# Patient Record
Sex: FEMALE | Race: WHITE | NOT HISPANIC OR LATINO | Employment: FULL TIME | URBAN - METROPOLITAN AREA
[De-identification: names, ages, dates, MRNs, and addresses within clinical notes are randomized per-mention and may not be internally consistent; named-entity substitution may affect disease eponyms.]

---

## 2017-01-25 ENCOUNTER — APPOINTMENT (OUTPATIENT)
Dept: LAB | Age: 67
End: 2017-01-25
Payer: COMMERCIAL

## 2017-01-25 ENCOUNTER — TRANSCRIBE ORDERS (OUTPATIENT)
Dept: ADMINISTRATIVE | Age: 67
End: 2017-01-25

## 2017-01-25 DIAGNOSIS — I10 ESSENTIAL HYPERTENSION, MALIGNANT: Primary | ICD-10-CM

## 2017-01-25 DIAGNOSIS — I10 ESSENTIAL HYPERTENSION, MALIGNANT: ICD-10-CM

## 2017-01-25 LAB
ALBUMIN SERPL BCP-MCNC: 3.7 G/DL (ref 3.5–5)
ALP SERPL-CCNC: 73 U/L (ref 46–116)
ALT SERPL W P-5'-P-CCNC: 29 U/L (ref 12–78)
ANION GAP SERPL CALCULATED.3IONS-SCNC: 9 MMOL/L (ref 4–13)
AST SERPL W P-5'-P-CCNC: 13 U/L (ref 5–45)
BASOPHILS # BLD AUTO: 0.02 THOUSANDS/ΜL (ref 0–0.1)
BASOPHILS NFR BLD AUTO: 0 % (ref 0–1)
BILIRUB SERPL-MCNC: 0.83 MG/DL (ref 0.2–1)
BUN SERPL-MCNC: 14 MG/DL (ref 5–25)
CALCIUM SERPL-MCNC: 8.8 MG/DL (ref 8.3–10.1)
CHLORIDE SERPL-SCNC: 107 MMOL/L (ref 100–108)
CHOLEST SERPL-MCNC: 200 MG/DL (ref 50–200)
CO2 SERPL-SCNC: 26 MMOL/L (ref 21–32)
CREAT SERPL-MCNC: 0.81 MG/DL (ref 0.6–1.3)
EOSINOPHIL # BLD AUTO: 0.18 THOUSAND/ΜL (ref 0–0.61)
EOSINOPHIL NFR BLD AUTO: 2 % (ref 0–6)
ERYTHROCYTE [DISTWIDTH] IN BLOOD BY AUTOMATED COUNT: 13.2 % (ref 11.6–15.1)
GFR SERPL CREATININE-BSD FRML MDRD: >60 ML/MIN/1.73SQ M
GLUCOSE SERPL-MCNC: 105 MG/DL (ref 65–140)
HCT VFR BLD AUTO: 43.7 % (ref 34.8–46.1)
HDLC SERPL-MCNC: 52 MG/DL (ref 40–60)
HGB BLD-MCNC: 14.5 G/DL (ref 11.5–15.4)
LDLC SERPL CALC-MCNC: 121 MG/DL (ref 0–100)
LYMPHOCYTES # BLD AUTO: 2.48 THOUSANDS/ΜL (ref 0.6–4.47)
LYMPHOCYTES NFR BLD AUTO: 30 % (ref 14–44)
MCH RBC QN AUTO: 31 PG (ref 26.8–34.3)
MCHC RBC AUTO-ENTMCNC: 33.2 G/DL (ref 31.4–37.4)
MCV RBC AUTO: 93 FL (ref 82–98)
MONOCYTES # BLD AUTO: 0.76 THOUSAND/ΜL (ref 0.17–1.22)
MONOCYTES NFR BLD AUTO: 9 % (ref 4–12)
NEUTROPHILS # BLD AUTO: 4.89 THOUSANDS/ΜL (ref 1.85–7.62)
NEUTS SEG NFR BLD AUTO: 59 % (ref 43–75)
NRBC BLD AUTO-RTO: 0 /100 WBCS
PLATELET # BLD AUTO: 461 THOUSANDS/UL (ref 149–390)
PMV BLD AUTO: 10.8 FL (ref 8.9–12.7)
POTASSIUM SERPL-SCNC: 3.6 MMOL/L (ref 3.5–5.3)
PROT SERPL-MCNC: 7 G/DL (ref 6.4–8.2)
RBC # BLD AUTO: 4.68 MILLION/UL (ref 3.81–5.12)
SODIUM SERPL-SCNC: 142 MMOL/L (ref 136–145)
TRIGL SERPL-MCNC: 137 MG/DL
TSH SERPL DL<=0.05 MIU/L-ACNC: 2.92 UIU/ML (ref 0.36–3.74)
WBC # BLD AUTO: 8.36 THOUSAND/UL (ref 4.31–10.16)

## 2017-01-25 PROCEDURE — 84443 ASSAY THYROID STIM HORMONE: CPT

## 2017-01-25 PROCEDURE — 80061 LIPID PANEL: CPT

## 2017-01-25 PROCEDURE — 80053 COMPREHEN METABOLIC PANEL: CPT

## 2017-01-25 PROCEDURE — 85025 COMPLETE CBC W/AUTO DIFF WBC: CPT

## 2017-01-25 PROCEDURE — 36415 COLL VENOUS BLD VENIPUNCTURE: CPT

## 2017-08-22 ENCOUNTER — TRANSCRIBE ORDERS (OUTPATIENT)
Dept: ADMINISTRATIVE | Age: 67
End: 2017-08-22

## 2017-08-22 ENCOUNTER — APPOINTMENT (OUTPATIENT)
Dept: LAB | Age: 67
End: 2017-08-22
Payer: COMMERCIAL

## 2017-08-22 DIAGNOSIS — Z11.59 SCREENING EXAMINATION FOR POLIOMYELITIS: ICD-10-CM

## 2017-08-22 DIAGNOSIS — Z11.59 SCREENING EXAMINATION FOR POLIOMYELITIS: Primary | ICD-10-CM

## 2017-08-22 LAB
25(OH)D3 SERPL-MCNC: 33.5 NG/ML (ref 30–100)
ALBUMIN SERPL BCP-MCNC: 4 G/DL (ref 3.5–5)
ALP SERPL-CCNC: 71 U/L (ref 46–116)
ALT SERPL W P-5'-P-CCNC: 23 U/L (ref 12–78)
ANION GAP SERPL CALCULATED.3IONS-SCNC: 5 MMOL/L (ref 4–13)
AST SERPL W P-5'-P-CCNC: 14 U/L (ref 5–45)
BASOPHILS # BLD AUTO: 0.01 THOUSANDS/ΜL (ref 0–0.1)
BASOPHILS NFR BLD AUTO: 0 % (ref 0–1)
BILIRUB SERPL-MCNC: 0.87 MG/DL (ref 0.2–1)
BUN SERPL-MCNC: 15 MG/DL (ref 5–25)
CALCIUM SERPL-MCNC: 9.4 MG/DL (ref 8.3–10.1)
CHLORIDE SERPL-SCNC: 103 MMOL/L (ref 100–108)
CHOLEST SERPL-MCNC: 196 MG/DL (ref 50–200)
CO2 SERPL-SCNC: 30 MMOL/L (ref 21–32)
CREAT SERPL-MCNC: 0.92 MG/DL (ref 0.6–1.3)
EOSINOPHIL # BLD AUTO: 0.14 THOUSAND/ΜL (ref 0–0.61)
EOSINOPHIL NFR BLD AUTO: 2 % (ref 0–6)
ERYTHROCYTE [DISTWIDTH] IN BLOOD BY AUTOMATED COUNT: 13.1 % (ref 11.6–15.1)
EST. AVERAGE GLUCOSE BLD GHB EST-MCNC: 105 MG/DL
GFR SERPL CREATININE-BSD FRML MDRD: 65 ML/MIN/1.73SQ M
GLUCOSE P FAST SERPL-MCNC: 96 MG/DL (ref 65–99)
HBA1C MFR BLD: 5.3 % (ref 4.2–6.3)
HCT VFR BLD AUTO: 45.6 % (ref 34.8–46.1)
HCV AB SER QL: NORMAL
HDLC SERPL-MCNC: 56 MG/DL (ref 40–60)
HGB BLD-MCNC: 15.2 G/DL (ref 11.5–15.4)
LDLC SERPL CALC-MCNC: 116 MG/DL (ref 0–100)
LYMPHOCYTES # BLD AUTO: 2.24 THOUSANDS/ΜL (ref 0.6–4.47)
LYMPHOCYTES NFR BLD AUTO: 32 % (ref 14–44)
MCH RBC QN AUTO: 31.3 PG (ref 26.8–34.3)
MCHC RBC AUTO-ENTMCNC: 33.3 G/DL (ref 31.4–37.4)
MCV RBC AUTO: 94 FL (ref 82–98)
MONOCYTES # BLD AUTO: 0.62 THOUSAND/ΜL (ref 0.17–1.22)
MONOCYTES NFR BLD AUTO: 9 % (ref 4–12)
NEUTROPHILS # BLD AUTO: 3.92 THOUSANDS/ΜL (ref 1.85–7.62)
NEUTS SEG NFR BLD AUTO: 57 % (ref 43–75)
NRBC BLD AUTO-RTO: 0 /100 WBCS
PLATELET # BLD AUTO: 401 THOUSANDS/UL (ref 149–390)
PMV BLD AUTO: 10.7 FL (ref 8.9–12.7)
POTASSIUM SERPL-SCNC: 4.1 MMOL/L (ref 3.5–5.3)
PROT SERPL-MCNC: 7.3 G/DL (ref 6.4–8.2)
RBC # BLD AUTO: 4.85 MILLION/UL (ref 3.81–5.12)
SODIUM SERPL-SCNC: 138 MMOL/L (ref 136–145)
TRIGL SERPL-MCNC: 121 MG/DL
TSH SERPL DL<=0.05 MIU/L-ACNC: 2.33 UIU/ML (ref 0.36–3.74)
WBC # BLD AUTO: 6.95 THOUSAND/UL (ref 4.31–10.16)

## 2017-08-22 PROCEDURE — 36415 COLL VENOUS BLD VENIPUNCTURE: CPT

## 2017-08-22 PROCEDURE — 86803 HEPATITIS C AB TEST: CPT

## 2017-08-22 PROCEDURE — 80053 COMPREHEN METABOLIC PANEL: CPT

## 2017-08-22 PROCEDURE — 82306 VITAMIN D 25 HYDROXY: CPT

## 2017-08-22 PROCEDURE — 85025 COMPLETE CBC W/AUTO DIFF WBC: CPT

## 2017-08-22 PROCEDURE — 83036 HEMOGLOBIN GLYCOSYLATED A1C: CPT

## 2017-08-22 PROCEDURE — 84443 ASSAY THYROID STIM HORMONE: CPT

## 2017-08-22 PROCEDURE — 80061 LIPID PANEL: CPT

## 2019-04-22 ENCOUNTER — TRANSCRIBE ORDERS (OUTPATIENT)
Dept: ADMINISTRATIVE | Age: 69
End: 2019-04-22

## 2019-04-22 ENCOUNTER — APPOINTMENT (OUTPATIENT)
Dept: LAB | Age: 69
End: 2019-04-22
Payer: COMMERCIAL

## 2019-04-22 DIAGNOSIS — R73.01 IMPAIRED FASTING GLUCOSE: ICD-10-CM

## 2019-04-22 DIAGNOSIS — Z13.29 SCREENING FOR THYROID DISORDER: ICD-10-CM

## 2019-04-22 DIAGNOSIS — Z13.29 SCREENING FOR THYROID DISORDER: Primary | ICD-10-CM

## 2019-04-22 LAB
25(OH)D3 SERPL-MCNC: 27.3 NG/ML (ref 30–100)
ALBUMIN SERPL BCP-MCNC: 4 G/DL (ref 3.5–5)
ALP SERPL-CCNC: 72 U/L (ref 46–116)
ALT SERPL W P-5'-P-CCNC: 27 U/L (ref 12–78)
ANION GAP SERPL CALCULATED.3IONS-SCNC: 7 MMOL/L (ref 4–13)
AST SERPL W P-5'-P-CCNC: 16 U/L (ref 5–45)
BASOPHILS # BLD AUTO: 0.04 THOUSANDS/ΜL (ref 0–0.1)
BASOPHILS NFR BLD AUTO: 1 % (ref 0–1)
BILIRUB SERPL-MCNC: 0.78 MG/DL (ref 0.2–1)
BUN SERPL-MCNC: 15 MG/DL (ref 5–25)
CALCIUM SERPL-MCNC: 9 MG/DL (ref 8.3–10.1)
CHLORIDE SERPL-SCNC: 107 MMOL/L (ref 100–108)
CHOLEST SERPL-MCNC: 204 MG/DL (ref 50–200)
CO2 SERPL-SCNC: 27 MMOL/L (ref 21–32)
CREAT SERPL-MCNC: 0.9 MG/DL (ref 0.6–1.3)
EOSINOPHIL # BLD AUTO: 0.17 THOUSAND/ΜL (ref 0–0.61)
EOSINOPHIL NFR BLD AUTO: 3 % (ref 0–6)
ERYTHROCYTE [DISTWIDTH] IN BLOOD BY AUTOMATED COUNT: 12.5 % (ref 11.6–15.1)
EST. AVERAGE GLUCOSE BLD GHB EST-MCNC: 114 MG/DL
GFR SERPL CREATININE-BSD FRML MDRD: 65 ML/MIN/1.73SQ M
GLUCOSE P FAST SERPL-MCNC: 101 MG/DL (ref 65–99)
HBA1C MFR BLD: 5.6 % (ref 4.2–6.3)
HCT VFR BLD AUTO: 44.9 % (ref 34.8–46.1)
HDLC SERPL-MCNC: 51 MG/DL (ref 40–60)
HGB BLD-MCNC: 15 G/DL (ref 11.5–15.4)
IMM GRANULOCYTES # BLD AUTO: 0.01 THOUSAND/UL (ref 0–0.2)
IMM GRANULOCYTES NFR BLD AUTO: 0 % (ref 0–2)
LDLC SERPL CALC-MCNC: 124 MG/DL (ref 0–100)
LYMPHOCYTES # BLD AUTO: 1.88 THOUSANDS/ΜL (ref 0.6–4.47)
LYMPHOCYTES NFR BLD AUTO: 29 % (ref 14–44)
MCH RBC QN AUTO: 30.8 PG (ref 26.8–34.3)
MCHC RBC AUTO-ENTMCNC: 33.4 G/DL (ref 31.4–37.4)
MCV RBC AUTO: 92 FL (ref 82–98)
MONOCYTES # BLD AUTO: 0.51 THOUSAND/ΜL (ref 0.17–1.22)
MONOCYTES NFR BLD AUTO: 8 % (ref 4–12)
NEUTROPHILS # BLD AUTO: 3.8 THOUSANDS/ΜL (ref 1.85–7.62)
NEUTS SEG NFR BLD AUTO: 59 % (ref 43–75)
NONHDLC SERPL-MCNC: 153 MG/DL
NRBC BLD AUTO-RTO: 0 /100 WBCS
PLATELET # BLD AUTO: 432 THOUSANDS/UL (ref 149–390)
PMV BLD AUTO: 10.5 FL (ref 8.9–12.7)
POTASSIUM SERPL-SCNC: 3.2 MMOL/L (ref 3.5–5.3)
PROT SERPL-MCNC: 7.1 G/DL (ref 6.4–8.2)
RBC # BLD AUTO: 4.87 MILLION/UL (ref 3.81–5.12)
SODIUM SERPL-SCNC: 141 MMOL/L (ref 136–145)
TRIGL SERPL-MCNC: 143 MG/DL
TSH SERPL DL<=0.05 MIU/L-ACNC: 2.55 UIU/ML (ref 0.36–3.74)
WBC # BLD AUTO: 6.41 THOUSAND/UL (ref 4.31–10.16)

## 2019-04-22 PROCEDURE — 85025 COMPLETE CBC W/AUTO DIFF WBC: CPT

## 2019-04-22 PROCEDURE — 84443 ASSAY THYROID STIM HORMONE: CPT

## 2019-04-22 PROCEDURE — 82306 VITAMIN D 25 HYDROXY: CPT

## 2019-04-22 PROCEDURE — 36415 COLL VENOUS BLD VENIPUNCTURE: CPT

## 2019-04-22 PROCEDURE — 80053 COMPREHEN METABOLIC PANEL: CPT

## 2019-04-22 PROCEDURE — 83036 HEMOGLOBIN GLYCOSYLATED A1C: CPT

## 2019-04-22 PROCEDURE — 80061 LIPID PANEL: CPT

## 2024-05-09 ENCOUNTER — APPOINTMENT (EMERGENCY)
Dept: ULTRASOUND IMAGING | Facility: HOSPITAL | Age: 74
End: 2024-05-09
Payer: COMMERCIAL

## 2024-05-09 ENCOUNTER — HOSPITAL ENCOUNTER (EMERGENCY)
Facility: HOSPITAL | Age: 74
Discharge: HOME | End: 2024-05-10
Attending: EMERGENCY MEDICINE
Payer: COMMERCIAL

## 2024-05-09 ENCOUNTER — OFFICE VISIT (OUTPATIENT)
Dept: URGENT CARE | Age: 74
End: 2024-05-09
Payer: COMMERCIAL

## 2024-05-09 ENCOUNTER — APPOINTMENT (EMERGENCY)
Dept: CT IMAGING | Facility: HOSPITAL | Age: 74
End: 2024-05-09
Payer: COMMERCIAL

## 2024-05-09 VITALS
TEMPERATURE: 98.3 F | SYSTOLIC BLOOD PRESSURE: 137 MMHG | HEART RATE: 93 BPM | RESPIRATION RATE: 18 BRPM | OXYGEN SATURATION: 98 % | DIASTOLIC BLOOD PRESSURE: 87 MMHG

## 2024-05-09 DIAGNOSIS — R10.32 ABDOMINAL PAIN, LLQ: Primary | ICD-10-CM

## 2024-05-09 DIAGNOSIS — K76.0 HEPATIC STEATOSIS: ICD-10-CM

## 2024-05-09 DIAGNOSIS — K57.92 DIVERTICULITIS: Primary | ICD-10-CM

## 2024-05-09 LAB
ALBUMIN SERPL BCP-MCNC: 4.1 G/DL (ref 3.5–5)
ALP SERPL-CCNC: 66 U/L (ref 34–104)
ALT SERPL W P-5'-P-CCNC: 16 U/L (ref 7–52)
ANION GAP SERPL CALCULATED.3IONS-SCNC: 8 MMOL/L (ref 4–13)
AST SERPL W P-5'-P-CCNC: 17 U/L (ref 13–39)
BASOPHILS # BLD AUTO: 0.04 THOUSANDS/ÂΜL (ref 0–0.1)
BASOPHILS NFR BLD AUTO: 0 % (ref 0–1)
BILIRUB SERPL-MCNC: 0.82 MG/DL (ref 0.2–1)
BILIRUB UR QL STRIP: NEGATIVE
BUN SERPL-MCNC: 12 MG/DL (ref 5–25)
CALCIUM SERPL-MCNC: 9.1 MG/DL (ref 8.4–10.2)
CHLORIDE SERPL-SCNC: 103 MMOL/L (ref 96–108)
CLARITY UR: CLEAR
CO2 SERPL-SCNC: 27 MMOL/L (ref 21–32)
COLOR UR: ABNORMAL
CREAT SERPL-MCNC: 0.78 MG/DL (ref 0.6–1.3)
EOSINOPHIL # BLD AUTO: 0.03 THOUSAND/ÂΜL (ref 0–0.61)
EOSINOPHIL NFR BLD AUTO: 0 % (ref 0–6)
ERYTHROCYTE [DISTWIDTH] IN BLOOD BY AUTOMATED COUNT: 13.2 % (ref 11.6–15.1)
GFR SERPL CREATININE-BSD FRML MDRD: 75 ML/MIN/1.73SQ M
GLUCOSE SERPL-MCNC: 118 MG/DL (ref 65–140)
GLUCOSE UR STRIP-MCNC: NEGATIVE MG/DL
HCT VFR BLD AUTO: 42.5 % (ref 34.8–46.1)
HGB BLD-MCNC: 14.2 G/DL (ref 11.5–15.4)
HGB UR QL STRIP.AUTO: NEGATIVE
IMM GRANULOCYTES # BLD AUTO: 0.07 THOUSAND/UL (ref 0–0.2)
IMM GRANULOCYTES NFR BLD AUTO: 1 % (ref 0–2)
KETONES UR STRIP-MCNC: ABNORMAL MG/DL
LEUKOCYTE ESTERASE UR QL STRIP: NEGATIVE
LIPASE SERPL-CCNC: 16 U/L (ref 11–82)
LYMPHOCYTES # BLD AUTO: 2.1 THOUSANDS/ÂΜL (ref 0.6–4.47)
LYMPHOCYTES NFR BLD AUTO: 15 % (ref 14–44)
MCH RBC QN AUTO: 30.5 PG (ref 26.8–34.3)
MCHC RBC AUTO-ENTMCNC: 33.4 G/DL (ref 31.4–37.4)
MCV RBC AUTO: 91 FL (ref 82–98)
MONOCYTES # BLD AUTO: 0.99 THOUSAND/ÂΜL (ref 0.17–1.22)
MONOCYTES NFR BLD AUTO: 7 % (ref 4–12)
NEUTROPHILS # BLD AUTO: 10.97 THOUSANDS/ÂΜL (ref 1.85–7.62)
NEUTS SEG NFR BLD AUTO: 77 % (ref 43–75)
NITRITE UR QL STRIP: NEGATIVE
NRBC BLD AUTO-RTO: 0 /100 WBCS
PH UR STRIP.AUTO: 6 [PH]
PLATELET # BLD AUTO: 368 THOUSANDS/UL (ref 149–390)
PMV BLD AUTO: 9.5 FL (ref 8.9–12.7)
POTASSIUM SERPL-SCNC: 3.8 MMOL/L (ref 3.5–5.3)
PROT SERPL-MCNC: 6.8 G/DL (ref 6.4–8.4)
PROT UR STRIP-MCNC: NEGATIVE MG/DL
RBC # BLD AUTO: 4.66 MILLION/UL (ref 3.81–5.12)
SODIUM SERPL-SCNC: 138 MMOL/L (ref 135–147)
SP GR UR STRIP.AUTO: <=1.005 (ref 1–1.03)
UROBILINOGEN UR QL STRIP.AUTO: 1 E.U./DL
WBC # BLD AUTO: 14.2 THOUSAND/UL (ref 4.31–10.16)

## 2024-05-09 PROCEDURE — 80053 COMPREHEN METABOLIC PANEL: CPT | Performed by: EMERGENCY MEDICINE

## 2024-05-09 PROCEDURE — 85025 COMPLETE CBC W/AUTO DIFF WBC: CPT | Performed by: EMERGENCY MEDICINE

## 2024-05-09 PROCEDURE — S9083 URGENT CARE CENTER GLOBAL: HCPCS | Performed by: STUDENT IN AN ORGANIZED HEALTH CARE EDUCATION/TRAINING PROGRAM

## 2024-05-09 PROCEDURE — 83690 ASSAY OF LIPASE: CPT | Performed by: EMERGENCY MEDICINE

## 2024-05-09 PROCEDURE — 99284 EMERGENCY DEPT VISIT MOD MDM: CPT

## 2024-05-09 PROCEDURE — G0384 LEV 5 HOSP TYPE B ED VISIT: HCPCS | Performed by: STUDENT IN AN ORGANIZED HEALTH CARE EDUCATION/TRAINING PROGRAM

## 2024-05-09 PROCEDURE — 99284 EMERGENCY DEPT VISIT MOD MDM: CPT | Performed by: EMERGENCY MEDICINE

## 2024-05-09 PROCEDURE — 74177 CT ABD & PELVIS W/CONTRAST: CPT

## 2024-05-09 PROCEDURE — 36415 COLL VENOUS BLD VENIPUNCTURE: CPT

## 2024-05-09 PROCEDURE — 76705 ECHO EXAM OF ABDOMEN: CPT

## 2024-05-09 PROCEDURE — 81003 URINALYSIS AUTO W/O SCOPE: CPT | Performed by: EMERGENCY MEDICINE

## 2024-05-09 RX ORDER — BISOPROLOL FUMARATE AND HYDROCHLOROTHIAZIDE 2.5; 6.25 MG/1; MG/1
1 TABLET ORAL DAILY
COMMUNITY

## 2024-05-09 RX ADMIN — IOHEXOL 100 ML: 350 INJECTION, SOLUTION INTRAVENOUS at 22:30

## 2024-05-09 NOTE — PROGRESS NOTES
"  [facility]        NAME: Nae Szymanski is a 73 y.o. female  : 1950    MRN: [id number]  DATE: May 9, 2024  TIME: 6:48 PM    Assessment and Plan   Abdominal pain, LLQ [R10.32]  1. Abdominal pain, LLQ  Transfer to other facility      Discussed limitations of evaluation at urgent care.  DDx includes diverticulitis, however her last colonoscopy which was about 10 years ago did not show diverticulosis to her knowledge.  With her significant discomfort, recommended transfer to ER for further evaluation.  Patient and  are agreement and plan and will proceed to [place] ER now.      Patient Instructions       Follow up with PCP in 3-5 days.  Proceed to  ER if symptoms worsen.    If tests have been performed at [facility], our office will contact you with results if changes need to be made to the care plan discussed with you at the visit.  You can review your full results on [facility] MyChart.    Chief Complaint     Chief Complaint   Patient presents with    Abdominal Pain     In the middle of the night patient started feeling discomfort in mid abdomen. Patient passing bowel movements 4-5 times today without relief. Hurts most when laying down on her back. Sitting can also cause distress. Patient states that it is not pain but is discomfort and distress.          History of Present Illness       Patient presents with her  for \"abdominal discomfort/distress\".  Last night, she woke up from sleep with central abdominal discomfort.  She had a normal stool, thinking this would relieve it.  Did not have any relief after passing bowel movement.  She has had about 4 brown formed stools today, this has not changed her symptoms.  She describes that the pain is worse with laying down, a bit better with sitting, and best with standing up.  No fevers, chills.  No different foods, no recent travel.  She had a brief episode of nausea, no vomiting.  Try to eat toast with butter and jam earlier today, " this seemed to make it worse.  She denies any chest pain, palpitations, shortness of breath.  The pain does not radiate, denies radiation to her back.  Denies any urinary symptoms, no dysuria.  She has not experienced symptoms like this before. Usually with high pain tolerance and does not seek medical care unless having significant sx.  SHx of appendectomy, tubal ligation.        Review of Systems   Review of Systems   All other systems reviewed and are negative.        Current Medications       Current Outpatient Medications:     bisoprolol-hydrochlorothiazide (ZIAC) 2.5-6.25 MG per tablet, Take 1 tablet by mouth daily, Disp: , Rfl:     Current Allergies     Allergies as of 05/09/2024 - Reviewed 05/09/2024   Allergen Reaction Noted    Codeine Vomiting 05/09/2024            The following portions of the patient's history were reviewed and updated as appropriate: allergies, current medications, past family history, past medical history, past social history, past surgical history and problem list.     No past medical history on file.    No past surgical history on file.    No family history on file.      Medications have been verified.        Objective   /87   Pulse 93   Temp 98.3 °F (36.8 °C)   Resp 18   SpO2 98%   No LMP recorded.       Physical Exam     Physical Exam  Constitutional:       General: She is not in acute distress.     Appearance: Normal appearance. She is not ill-appearing or toxic-appearing.   HENT:      Head: Normocephalic and atraumatic.      Right Ear: External ear normal.      Left Ear: External ear normal.      Nose: Nose normal.      Mouth/Throat:      Mouth: Mucous membranes are moist.   Eyes:      Extraocular Movements: Extraocular movements intact.   Cardiovascular:      Rate and Rhythm: Normal rate and regular rhythm.      Heart sounds: Normal heart sounds.   Pulmonary:      Effort: Pulmonary effort is normal. No respiratory distress.      Breath sounds: Normal breath sounds. No  stridor. No wheezing, rhonchi or rales.   Abdominal:      General: Abdomen is flat. Bowel sounds are normal.      Palpations: Abdomen is soft.      Tenderness: There is abdominal tenderness. There is no guarding or rebound.      Comments: Tender central abd and b/l lower quadrants   Greatest tenderness LLQ   Skin:     General: Skin is warm and dry.      Capillary Refill: Capillary refill takes less than 2 seconds.      Findings: No rash.   Neurological:      Mental Status: She is alert.      Gait: Gait normal.   Psychiatric:         Behavior: Behavior normal.                      Mastoid Interpolation Flap Text: A decision was made to reconstruct the defect utilizing an interpolation axial flap and a staged reconstruction.  A telfa template was made of the defect.  This telfa template was then used to outline the mastoid interpolation flap.  The donor area for the pedicle flap was then injected with anesthesia.  The flap was excised through the skin and subcutaneous tissue down to the layer of the underlying musculature.  The pedicle flap was carefully excised within this deep plane to maintain its blood supply.  The edges of the donor site were undermined.   The donor site was closed in a primary fashion.  The pedicle was then rotated into position and sutured.  Once the tube was sutured into place, adequate blood supply was confirmed with blanching and refill.  The pedicle was then wrapped with xeroform gauze and dressed appropriately with a telfa and gauze bandage to ensure continued blood supply and protect the attached pedicle.

## 2024-05-10 VITALS
OXYGEN SATURATION: 98 % | DIASTOLIC BLOOD PRESSURE: 90 MMHG | HEART RATE: 72 BPM | RESPIRATION RATE: 18 BRPM | TEMPERATURE: 98.1 F | SYSTOLIC BLOOD PRESSURE: 145 MMHG

## 2024-05-10 RX ORDER — AMOXICILLIN AND CLAVULANATE POTASSIUM 875; 125 MG/1; MG/1
1 TABLET, FILM COATED ORAL ONCE
Status: COMPLETED | OUTPATIENT
Start: 2024-05-10 | End: 2024-05-10

## 2024-05-10 RX ORDER — AMOXICILLIN AND CLAVULANATE POTASSIUM 875; 125 MG/1; MG/1
1 TABLET, FILM COATED ORAL EVERY 12 HOURS
Qty: 16 TABLET | Refills: 0 | Status: SHIPPED | OUTPATIENT
Start: 2024-05-10 | End: 2024-05-18

## 2024-05-10 RX ADMIN — AMOXICILLIN AND CLAVULANATE POTASSIUM 1 TABLET: 875; 125 TABLET, FILM COATED ORAL at 00:43

## 2024-05-10 NOTE — ED PROVIDER NOTES
History  Chief Complaint   Patient presents with    Abdominal Pain     Patient woke up in middle of night with upper abdomen discomfort (sm. intestine area per patient), patient was able to go back to sleep, went to work, had several bowel movement. Pain worse when lying on back. Standing helps the pain most. Was seen at [facility] now, sent to ED for rule out diverticulosis/diverticulitis.      73-year-old female number past month call history presenting with LLQ abdominal discomfort.  Started yesterday evening waking her up from sleep.  No change with bowel movements.  Had normal bowel movement this morning without constipation, diarrhea, blood, tarry look to it.  Some nausea without vomiting.  Tolerating normal diet.  Went to urgent care concern for diverticulitis and sent her to the ED for evaluation.  Has never had this kind of thing before.  No dysuria or hematuria.        Prior to Admission Medications   Prescriptions Last Dose Informant Patient Reported? Taking?   bisoprolol-hydrochlorothiazide (ZIAC) 2.5-6.25 MG per tablet   Yes No   Sig: Take 1 tablet by mouth daily      Facility-Administered Medications: None       History reviewed. No pertinent past medical history.    History reviewed. No pertinent surgical history.    History reviewed. No pertinent family history.  I have reviewed and agree with the history as documented.    E-Cigarette/Vaping     E-Cigarette/Vaping Substances           Review of Systems    Physical Exam  ED Triage Vitals   Temperature Pulse Respirations Blood Pressure SpO2   05/09/24 1908 05/09/24 1906 05/09/24 1906 05/09/24 1908 05/09/24 1906   98.1 °F (36.7 °C) 81 18 139/78 96 %      Temp Source Heart Rate Source Patient Position - Orthostatic VS BP Location FiO2 (%)   05/09/24 1908 05/09/24 1906 05/09/24 1906 05/09/24 1906 --   Oral Monitor Sitting Left arm       Pain Score       --                    Orthostatic Vital Signs  Vitals:    05/09/24 1906 05/09/24 1908 05/09/24 2133    BP:  139/78 145/90   Pulse: 81  72   Patient Position - Orthostatic VS: Sitting  Lying       Physical Exam    ED Medications  Medications   iohexol (OMNIPAQUE) 350 MG/ML injection (MULTI-DOSE) 100 mL (100 mL Intravenous Given 5/9/24 2230)       Diagnostic Studies  Results Reviewed       Procedure Component Value Units Date/Time    UA w Reflex to Microscopic w Reflex to Culture [[id number]]  (Abnormal) Collected: 05/09/24 2214    Lab Status: Final result Specimen: Urine, Clean Catch Updated: 05/09/24 2232     Color, UA Light Yellow     Clarity, UA Clear     Specific Gravity, UA <=1.005     pH, UA 6.0     Leukocytes, UA Negative     Nitrite, UA Negative     Protein, UA Negative mg/dl      Glucose, UA Negative mg/dl      Ketones, UA Trace mg/dl      Urobilinogen, UA 1.0 E.U./dl      Bilirubin, UA Negative     Occult Blood, UA Negative    Comprehensive metabolic panel [[id number]] Collected: 05/09/24 2131    Lab Status: Final result Specimen: Blood from Arm, Left Updated: 05/09/24 2157     Sodium 138 mmol/L      Potassium 3.8 mmol/L      Chloride 103 mmol/L      CO2 27 mmol/L      ANION GAP 8 mmol/L      BUN 12 mg/dL      Creatinine 0.78 mg/dL      Glucose 118 mg/dL      Calcium 9.1 mg/dL      AST 17 U/L      ALT 16 U/L      Alkaline Phosphatase 66 U/L      Total Protein 6.8 g/dL      Albumin 4.1 g/dL      Total Bilirubin 0.82 mg/dL      eGFR 75 ml/min/1.73sq m     Narrative:      National Kidney Disease Foundation guidelines for Chronic Kidney Disease (CKD):     Stage 1 with normal or high GFR (GFR > 90 mL/min/1.73 square meters)    Stage 2 Mild CKD (GFR = 60-89 mL/min/1.73 square meters)    Stage 3A Moderate CKD (GFR = 45-59 mL/min/1.73 square meters)    Stage 3B Moderate CKD (GFR = 30-44 mL/min/1.73 square meters)    Stage 4 Severe CKD (GFR = 15-29 mL/min/1.73 square meters)    Stage 5 End Stage CKD (GFR <15 mL/min/1.73 square meters)  Note: GFR calculation is accurate only with a steady state creatinine    Lipase  [[id number]]  (Normal) Collected: 05/09/24 2131    Lab Status: Final result Specimen: Blood from Arm, Left Updated: 05/09/24 2157     Lipase 16 u/L     CBC and differential [[id number]]  (Abnormal) Collected: 05/09/24 2131    Lab Status: Final result Specimen: Blood from Arm, Left Updated: 05/09/24 2145     WBC 14.20 Thousand/uL      RBC 4.66 Million/uL      Hemoglobin 14.2 g/dL      Hematocrit 42.5 %      MCV 91 fL      MCH 30.5 pg      MCHC 33.4 g/dL      RDW 13.2 %      MPV 9.5 fL      Platelets 368 Thousands/uL      nRBC 0 /100 WBCs      Segmented % 77 %      Immature Grans % 1 %      Lymphocytes % 15 %      Monocytes % 7 %      Eosinophils Relative 0 %      Basophils Relative 0 %      Absolute Neutrophils 10.97 Thousands/µL      Absolute Immature Grans 0.07 Thousand/uL      Absolute Lymphocytes 2.10 Thousands/µL      Absolute Monocytes 0.99 Thousand/µL      Eosinophils Absolute 0.03 Thousand/µL      Basophils Absolute 0.04 Thousands/µL                    US right upper quadrant    (Results Pending)   CT abdomen pelvis with contrast    (Results Pending)         Procedures  Procedures      ED Course  ED Course as of 05/09/24 2238   Thu May 09, 2024   2156 WBC(!): 14.20   2156 Temperature: 98.1 °F (36.7 °C)   2158 Comprehensive metabolic panel  wnl   2158 LIPASE: 16   2233 UA w Reflex to Microscopic w Reflex to Culture(!)  wnl                             SBIRT 22yo+      Flowsheet Row Most Recent Value   Initial Alcohol Screen: US AUDIT-C     1. How often do you have a drink containing alcohol? 0 Filed at: 05/09/2024 1908   2. How many drinks containing alcohol do you have on a typical day you are drinking?  0 Filed at: 05/09/2024 1908   3a. Male UNDER 65: How often do you have five or more drinks on one occasion? 0 Filed at: 05/09/2024 1908   3b. FEMALE Any Age, or MALE 65+: How often do you have 4 or more drinks on one occassion? 0 Filed at: 05/09/2024 1908   Audit-C Score 0 Filed at: 05/09/2024 1908   EL: How  many times in the past year have you...    Used an illegal drug or used a prescription medication for non-medical reasons? Never Filed at: 05/09/2024 1908                  Medical Decision Making  Amount and/or Complexity of Data Reviewed  Labs: ordered. Decision-making details documented in ED Course.  Radiology: ordered.    Risk  Prescription drug management.          Disposition  Final diagnoses:   None     ED Disposition       None          Follow-up Information    None         Patient's Medications   Discharge Prescriptions    No medications on file     No discharge procedures on file.    PDMP Review       None             ED Provider  Attending physically available and evaluated Nae DEX Szymanski. I managed the patient along with the ED Attending.    Electronically Signed by         performed: [id number]               Procedures  Procedures      ED Course  ED Course as of 05/13/24 0817   u May 09, 2024   2156 WBC(!): 14.20   2156 Temperature: 98.1 °F (36.7 °C)   2158 Comprehensive metabolic panel  wnl   2158 LIPASE: 16   2233 UA w Reflex to Microscopic w Reflex to Culture(!)  wnl   Fri May 10, 2024   0021 CT abdomen pelvis with contrast  Scattered diverticulosis. Subtle hazy pericolonic stranding at the sigmoid colon (axial image 139) which is suspicious for mild/early acute diverticulitis. Correlate clinically.     There is an 11 mm nodular density at the medial right breast (axial image 5). Correlate with recent mammogram.     Hepatomegaly. Hepatic steatosis.     0028 US right upper quadrant  No cholelithiasis or evidence for acute cholecystitis.     Hepatomegaly. Hepatic steatosis.                               SBIRT 22yo+      Flowsheet Row Most Recent Value   Initial Alcohol Screen: US AUDIT-C     1. How often do you have a drink containing alcohol? 0 Filed at: 05/09/2024 1908   2. How many drinks containing alcohol do you have on a typical day you are drinking?  0 Filed at: 05/09/2024 1908   3a. Male UNDER 65: How often do you have five or more drinks on one occasion? 0 Filed at: 05/09/2024 1908   3b. FEMALE Any Age, or MALE 65+: How often do you have 4 or more drinks on one occassion? 0 Filed at: 05/09/2024 1908   Audit-C Score 0 Filed at: 05/09/2024 1908   EL: How many times in the past year have you...    Used an illegal drug or used a prescription medication for non-medical reasons? Never Filed at: 05/09/2024 1908                  Medical Decision Making  Initial Impression: Presentation consistent with diverticulitis with LLQ abdominal pain and some nausea.  Not likely to be ovarian related given age and no history of ovarian pathology.  Since having bowel movements and flatulence do not suspect bowel obstruction.  UTI/pyonephritis or kidney stone unlikely without dysuria or  hematuria.  On abdominal exam did have positive Broderick sign distinct from the LLQ pain which is her chief complaint.  Probably has cholelithiasis in addition to diverticulitis.  Do not suspect ACS given that the pain is in the lower abdomen, no vomiting, no shortness of breath or diaphoresis    Initial Work Up: CT abdomen/pelvis with contrast and CBC, CMP, UA, lipase, RUQ ultrasound    Final Impression: CT scan showed early diverticulitis consistent with presentation.  No cholelithiasis but evidence of hepatic steatosis which could be accounted for the RUQ abdominal pain.  Thankfully LFTs normal.  Started patient on antibiotics.  Provided a referral for GI for both the diverticulitis and the hepatic steatosis and discussed at length with patient and  need for follow-up of these findings.  Patient tolerating p.o.  Patient discharged in good condition.      Problems Addressed:  Diverticulitis: acute illness or injury  Hepatic steatosis: undiagnosed new problem with uncertain prognosis    Amount and/or Complexity of Data Reviewed  Independent Historian: spouse  Labs: ordered. Decision-making details documented in ED Course.  Radiology: ordered. Decision-making details documented in ED Course.    Risk  Prescription drug management.          Disposition  Final diagnoses:   Diverticulitis   Hepatic steatosis     Time reflects when diagnosis was documented in both MDM as applicable and the Disposition within this note       Time User Action Codes Description Comment    5/10/2024 12:30 AM Delisa Yi Add [K57.92] Diverticulitis     5/10/2024 12:30 AM Delisa Yi Add [K76.0] Hepatic steatosis           ED Disposition       ED Disposition   Discharge    Condition   Stable    Date/Time   Fri May 10, 2024 0029    Comment   Nae Szymanski discharge to home/self care.                   Follow-up Information       Follow up With Specialties Details Why Contact Info Additional Information    [facility]  A referral has been placed for you.  They should call you to schedule appointment.  If you have not heard from them in a week, give this number a call [address]  [address]  [facility] [address] [phone] [facility] [place], [address], [place], [place], [address] [phone]            Discharge Medication List as of 5/10/2024 12:33 AM        START taking these medications    Details   amoxicillin-clavulanate (AUGMENTIN) 875-125 mg per tablet Take 1 tablet by mouth every 12 (twelve) hours for 8 days, Starting Fri 5/10/2024, Until Sat 5/18/2024, Normal           CONTINUE these medications which have NOT CHANGED    Details   bisoprolol-hydrochlorothiazide (ZIAC) 2.5-6.25 MG per tablet Take 1 tablet by mouth daily, Historical Med               PDMP Review       None             ED Provider  Attending physically available and evaluated Nae Szymanski. I managed the patient along with the ED Attending.    Electronically Signed by           Delisa Yi MD  05/13/24 7075       Delisa Yi MD  05/16/24 4471

## 2024-05-10 NOTE — ED ATTENDING ATTESTATION
5/9/2024  I, Francesca Girard MD, saw and evaluated the patient. I have discussed the patient with the resident/non-physician practitioner and agree with the resident's/non-physician practitioner's findings, Plan of Care, and MDM as documented in the resident's/non-physician practitioner's note, except where noted. All available labs and Radiology studies were reviewed.  I was present for key portions of any procedure(s) performed by the resident/non-physician practitioner and I was immediately available to provide assistance.       At this point I agree with the current assessment done in the Emergency Department.  I have conducted an independent evaluation of this patient a history and physical is as follows:    ED Course  ED Course as of 05/10/24 0415   Thu May 09, 2024   2226 73-year-old woman presenting to the emergency department with LLQ abdominal pain, (+) nausea, (-) vomiting worse with change in position.  Normal BM.  Vital signs on arrival within normal limits.    Sent in by Urgent Care.    Physical exam (+) Broderick's sign; (+) LLQ tenderness.  No respiratory distress.    Clinical presentation puts patient at risk for the following differential diagnoses:    Diverticulitis, colitis, gastroenteritis, gastritis, appendicitis, bowel obstruction, cholecystitis, cholelithiasis, GERD, peptic ulcer disease, pancreatitis, gastritis.    First nursing labs were already obtained.  Patient declines analgesia or antiemetics.   2227 WBC(!): 14.20  Leukocytosis noted.  Otherwise, CBC, CMP, lipase grossly unremarkable.  CT and ultrasound are pending at this time.   2232 Ketones, UA(!): Trace   Fri May 10, 2024   0052 US right upper quadrant  No cholelithiasis or evidence for acute cholecystitis.     Hepatomegaly. Hepatic steatosis.     0052 CT abdomen pelvis with contrast  Scattered diverticulosis. Subtle hazy pericolonic stranding at the sigmoid colon (axial image 139) which is suspicious for mild/early acute diverticulitis.  Correlate clinically.     There is an 11 mm nodular density at the medial right breast (axial image 5). Correlate with recent mammogram.     Hepatomegaly. Hepatic steatosis.     0052 Augmentin given for diverticulitis.    Upon re-assessment, patient feels improved.  Patient remained hemodynamically and clinically stable in the ED.  Strict return precautions given.  Patient verbalized understanding and will follow up as outpatient with PMD.  Upon discharge, patient was AO4, GCS15, and fully ambulatory.             Critical Care Time  Procedures

## 2024-05-10 NOTE — DISCHARGE INSTRUCTIONS
Make sure you complete the entire prescription of Antibiotics and take every day's dosage as prescribed. Do not suddenly stop taking, even if you feel better. If you are having a reaction to the medication, contact your PCP or return to the ED.

## 2024-07-22 ENCOUNTER — APPOINTMENT (OUTPATIENT)
Dept: NON INVASIVE DIAGNOSTICS | Facility: HOSPITAL | Age: 74
End: 2024-07-22
Payer: COMMERCIAL

## 2024-07-22 ENCOUNTER — APPOINTMENT (EMERGENCY)
Dept: CT IMAGING | Facility: HOSPITAL | Age: 74
End: 2024-07-22
Payer: COMMERCIAL

## 2024-07-22 ENCOUNTER — HOSPITAL ENCOUNTER (OUTPATIENT)
Facility: HOSPITAL | Age: 74
Setting detail: OBSERVATION
Discharge: HOME | End: 2024-07-24
Attending: EMERGENCY MEDICINE | Admitting: SURGERY
Payer: COMMERCIAL

## 2024-07-22 ENCOUNTER — APPOINTMENT (OUTPATIENT)
Dept: RADIOLOGY | Facility: HOSPITAL | Age: 74
End: 2024-07-22
Attending: RADIOLOGY
Payer: COMMERCIAL

## 2024-07-22 ENCOUNTER — APPOINTMENT (INPATIENT)
Dept: RADIOLOGY | Facility: HOSPITAL | Age: 74
End: 2024-07-22
Payer: COMMERCIAL

## 2024-07-22 DIAGNOSIS — M60.08 RECTUS SHEATH ABSCESS: ICD-10-CM

## 2024-07-22 DIAGNOSIS — L02.211 ABDOMINAL WALL ABSCESS: Primary | ICD-10-CM

## 2024-07-22 LAB
ALBUMIN SERPL BCG-MCNC: 3.9 G/DL (ref 3.5–5)
ALP SERPL-CCNC: 87 U/L (ref 34–104)
ALT SERPL W P-5'-P-CCNC: 17 U/L (ref 7–52)
ANION GAP SERPL CALCULATED.3IONS-SCNC: 11 MMOL/L (ref 4–13)
AORTIC ROOT: 3.1 CM
APICAL FOUR CHAMBER EJECTION FRACTION: 63 %
ASCENDING AORTA: 3.2 CM
AST SERPL W P-5'-P-CCNC: 15 U/L (ref 13–39)
BACTERIA UR QL AUTO: ABNORMAL /HPF
BASOPHILS # BLD AUTO: 0.03 THOUSANDS/ÂΜL (ref 0–0.1)
BASOPHILS NFR BLD AUTO: 0 % (ref 0–1)
BILIRUB SERPL-MCNC: 1.04 MG/DL (ref 0.2–1)
BILIRUB UR QL STRIP: NEGATIVE
BSA FOR ECHO PROCEDURE: 1.98 M2
BUN SERPL-MCNC: 16 MG/DL (ref 5–25)
CALCIUM SERPL-MCNC: 9.3 MG/DL (ref 8.4–10.2)
CHLORIDE SERPL-SCNC: 98 MMOL/L (ref 96–108)
CLARITY UR: CLEAR
CO2 SERPL-SCNC: 29 MMOL/L (ref 21–32)
COLOR UR: YELLOW
CREAT SERPL-MCNC: 0.71 MG/DL (ref 0.6–1.3)
E WAVE DECELERATION TIME: 194 MS
E/A RATIO: 0.68
EOSINOPHIL # BLD AUTO: 0.06 THOUSAND/ÂΜL (ref 0–0.61)
EOSINOPHIL NFR BLD AUTO: 1 % (ref 0–6)
ERYTHROCYTE [DISTWIDTH] IN BLOOD BY AUTOMATED COUNT: 12.5 % (ref 11.6–15.1)
FRACTIONAL SHORTENING: 32 (ref 28–44)
GFR SERPL CREATININE-BSD FRML MDRD: 84 ML/MIN/1.73SQ M
GLUCOSE SERPL-MCNC: 110 MG/DL (ref 65–140)
GLUCOSE UR STRIP-MCNC: NEGATIVE MG/DL
HCT VFR BLD AUTO: 41.3 % (ref 34.8–46.1)
HGB BLD-MCNC: 13.9 G/DL (ref 11.5–15.4)
HGB UR QL STRIP.AUTO: NEGATIVE
IMM GRANULOCYTES # BLD AUTO: 0.05 THOUSAND/UL (ref 0–0.2)
IMM GRANULOCYTES NFR BLD AUTO: 1 % (ref 0–2)
INTERVENTRICULAR SEPTUM IN DIASTOLE (PARASTERNAL SHORT AXIS VIEW): 1.1 CM
INTERVENTRICULAR SEPTUM: 1.1 CM (ref 0.6–1.1)
KETONES UR STRIP-MCNC: ABNORMAL MG/DL
LAAS-AP2: 12.7 CM2
LAAS-AP4: 17.4 CM2
LEFT ATRIUM AREA SYSTOLE SINGLE PLANE A4C: 17.3 CM2
LEFT ATRIUM SIZE: 3.8 CM
LEFT ATRIUM VOLUME (MOD BIPLANE): 36 ML
LEFT ATRIUM VOLUME INDEX (MOD BIPLANE): 18.1 ML/M2
LEFT INTERNAL DIMENSION IN SYSTOLE: 3.2 CM (ref 2.1–4)
LEFT VENTRICULAR INTERNAL DIMENSION IN DIASTOLE: 4.7 CM (ref 3.5–6)
LEFT VENTRICULAR POSTERIOR WALL IN END DIASTOLE: 1 CM
LEFT VENTRICULAR STROKE VOLUME: 63 ML
LEUKOCYTE ESTERASE UR QL STRIP: NEGATIVE
LIPASE SERPL-CCNC: 21 U/L (ref 11–82)
LVSV (TEICH): 63 ML
LYMPHOCYTES # BLD AUTO: 1.29 THOUSANDS/ÂΜL (ref 0.6–4.47)
LYMPHOCYTES NFR BLD AUTO: 13 % (ref 14–44)
MCH RBC QN AUTO: 30.1 PG (ref 26.8–34.3)
MCHC RBC AUTO-ENTMCNC: 33.7 G/DL (ref 31.4–37.4)
MCV RBC AUTO: 89 FL (ref 82–98)
MONOCYTES # BLD AUTO: 0.78 THOUSAND/ÂΜL (ref 0.17–1.22)
MONOCYTES NFR BLD AUTO: 8 % (ref 4–12)
MUCOUS THREADS UR QL AUTO: ABNORMAL
MV E'TISSUE VEL-SEP: 10 CM/S
MV PEAK A VEL: 0.92 M/S
MV PEAK E VEL: 63 CM/S
MV STENOSIS PRESSURE HALF TIME: 56 MS
MV VALVE AREA P 1/2 METHOD: 3.93
NEUTROPHILS # BLD AUTO: 7.42 THOUSANDS/ÂΜL (ref 1.85–7.62)
NEUTS SEG NFR BLD AUTO: 77 % (ref 43–75)
NITRITE UR QL STRIP: NEGATIVE
NON-SQ EPI CELLS URNS QL MICRO: ABNORMAL /HPF
NRBC BLD AUTO-RTO: 0 /100 WBCS
PH UR STRIP.AUTO: 6 [PH]
PLATELET # BLD AUTO: 432 THOUSANDS/UL (ref 149–390)
PMV BLD AUTO: 8.8 FL (ref 8.9–12.7)
POTASSIUM SERPL-SCNC: 3.2 MMOL/L (ref 3.5–5.3)
PROT SERPL-MCNC: 7.1 G/DL (ref 6.4–8.4)
PROT UR STRIP-MCNC: ABNORMAL MG/DL
RA PRESSURE ESTIMATED: 10 MMHG
RBC # BLD AUTO: 4.62 MILLION/UL (ref 3.81–5.12)
RBC #/AREA URNS AUTO: ABNORMAL /HPF
RIGHT ATRIAL 2D VOLUME: 34 ML
RIGHT ATRIUM AREA SYSTOLE A4C: 14.2 CM2
RIGHT VENTRICLE ID DIMENSION: 3.3 CM
RV PSP: 33 MMHG
SL CV LEFT ATRIUM LENGTH A2C: 5.1 CM
SL CV LV EF: 55
SL CV PED ECHO LEFT VENTRICLE DIASTOLIC VOLUME (MOD BIPLANE) 2D: 103 ML
SL CV PED ECHO LEFT VENTRICLE SYSTOLIC VOLUME (MOD BIPLANE) 2D: 40 ML
SODIUM SERPL-SCNC: 138 MMOL/L (ref 135–147)
SP GR UR STRIP.AUTO: 1.04 (ref 1–1.03)
TR MAX PG: 23 MMHG
TR PEAK VELOCITY: 2.4 M/S
TRICUSPID ANNULAR PLANE SYSTOLIC EXCURSION: 2.2 CM
TRICUSPID VALVE PEAK REGURGITATION VELOCITY: 2.42 M/S
UROBILINOGEN UR STRIP-ACNC: 3 MG/DL
WBC # BLD AUTO: 9.63 THOUSAND/UL (ref 4.31–10.16)
WBC #/AREA URNS AUTO: ABNORMAL /HPF

## 2024-07-22 PROCEDURE — 87070 CULTURE OTHR SPECIMN AEROBIC: CPT | Performed by: RADIOLOGY

## 2024-07-22 PROCEDURE — 80053 COMPREHEN METABOLIC PANEL: CPT | Performed by: EMERGENCY MEDICINE

## 2024-07-22 PROCEDURE — 96365 THER/PROPH/DIAG IV INF INIT: CPT

## 2024-07-22 PROCEDURE — C1729 CATH, DRAINAGE: HCPCS

## 2024-07-22 PROCEDURE — 87040 BLOOD CULTURE FOR BACTERIA: CPT | Performed by: SURGERY

## 2024-07-22 PROCEDURE — 87205 SMEAR GRAM STAIN: CPT | Performed by: RADIOLOGY

## 2024-07-22 PROCEDURE — 87081 CULTURE SCREEN ONLY: CPT | Performed by: SURGERY

## 2024-07-22 PROCEDURE — 10030 IMG GID FLU COLL DRG SFT TIS: CPT | Performed by: RADIOLOGY

## 2024-07-22 PROCEDURE — 10030 IMG GID FLU COLL DRG SFT TIS: CPT

## 2024-07-22 PROCEDURE — 99284 EMERGENCY DEPT VISIT MOD MDM: CPT

## 2024-07-22 PROCEDURE — 99223 1ST HOSP IP/OBS HIGH 75: CPT | Performed by: SURGERY

## 2024-07-22 PROCEDURE — 36415 COLL VENOUS BLD VENIPUNCTURE: CPT | Performed by: EMERGENCY MEDICINE

## 2024-07-22 PROCEDURE — 93306 TTE W/DOPPLER COMPLETE: CPT | Performed by: INTERNAL MEDICINE

## 2024-07-22 PROCEDURE — 87075 CULTR BACTERIA EXCEPT BLOOD: CPT | Performed by: RADIOLOGY

## 2024-07-22 PROCEDURE — 85025 COMPLETE CBC W/AUTO DIFF WBC: CPT | Performed by: EMERGENCY MEDICINE

## 2024-07-22 PROCEDURE — 83690 ASSAY OF LIPASE: CPT | Performed by: EMERGENCY MEDICINE

## 2024-07-22 PROCEDURE — 71045 X-RAY EXAM CHEST 1 VIEW: CPT

## 2024-07-22 PROCEDURE — 81001 URINALYSIS AUTO W/SCOPE: CPT | Performed by: EMERGENCY MEDICINE

## 2024-07-22 PROCEDURE — 99285 EMERGENCY DEPT VISIT HI MDM: CPT | Performed by: EMERGENCY MEDICINE

## 2024-07-22 PROCEDURE — 93306 TTE W/DOPPLER COMPLETE: CPT

## 2024-07-22 PROCEDURE — NC001 PR NO CHARGE: Performed by: RADIOLOGY

## 2024-07-22 PROCEDURE — 74177 CT ABD & PELVIS W/CONTRAST: CPT

## 2024-07-22 RX ORDER — HEPARIN SODIUM 5000 [USP'U]/ML
5000 INJECTION, SOLUTION INTRAVENOUS; SUBCUTANEOUS EVERY 8 HOURS SCHEDULED
Status: DISCONTINUED | OUTPATIENT
Start: 2024-07-22 | End: 2024-07-22

## 2024-07-22 RX ORDER — SODIUM CHLORIDE 9 MG/ML
10 INJECTION, SOLUTION INTRAMUSCULAR; INTRAVENOUS; SUBCUTANEOUS DAILY
Qty: 300 ML | Refills: 0 | Status: SHIPPED | OUTPATIENT
Start: 2024-07-22 | End: 2024-07-24

## 2024-07-22 RX ORDER — BISOPROLOL FUMARATE 5 MG/1
2.5 TABLET, FILM COATED ORAL DAILY
Status: DISCONTINUED | OUTPATIENT
Start: 2024-07-23 | End: 2024-07-24 | Stop reason: HOSPADM

## 2024-07-22 RX ORDER — LIDOCAINE WITH 8.4% SOD BICARB 0.9%(10ML)
SYRINGE (ML) INJECTION AS NEEDED
Status: COMPLETED | OUTPATIENT
Start: 2024-07-22 | End: 2024-07-22

## 2024-07-22 RX ORDER — ONDANSETRON 2 MG/ML
4 INJECTION INTRAMUSCULAR; INTRAVENOUS EVERY 4 HOURS PRN
Status: DISCONTINUED | OUTPATIENT
Start: 2024-07-22 | End: 2024-07-24 | Stop reason: HOSPADM

## 2024-07-22 RX ORDER — OXYCODONE HYDROCHLORIDE 5 MG/1
5 TABLET ORAL EVERY 4 HOURS PRN
Status: DISCONTINUED | OUTPATIENT
Start: 2024-07-22 | End: 2024-07-23

## 2024-07-22 RX ORDER — POTASSIUM CHLORIDE 20 MEQ/1
40 TABLET, EXTENDED RELEASE ORAL ONCE
Status: COMPLETED | OUTPATIENT
Start: 2024-07-22 | End: 2024-07-22

## 2024-07-22 RX ORDER — SODIUM CHLORIDE, SODIUM LACTATE, POTASSIUM CHLORIDE, CALCIUM CHLORIDE 600; 310; 30; 20 MG/100ML; MG/100ML; MG/100ML; MG/100ML
100 INJECTION, SOLUTION INTRAVENOUS CONTINUOUS
Status: DISCONTINUED | OUTPATIENT
Start: 2024-07-22 | End: 2024-07-22

## 2024-07-22 RX ORDER — CEFAZOLIN SODIUM 2 G/50ML
2000 SOLUTION INTRAVENOUS EVERY 8 HOURS
Status: DISCONTINUED | OUTPATIENT
Start: 2024-07-22 | End: 2024-07-24 | Stop reason: HOSPADM

## 2024-07-22 RX ORDER — HYDROMORPHONE HCL/PF 1 MG/ML
0.5 SYRINGE (ML) INJECTION ONCE
Status: COMPLETED | OUTPATIENT
Start: 2024-07-22 | End: 2024-07-22

## 2024-07-22 RX ORDER — ACETAMINOPHEN 325 MG/1
975 TABLET ORAL EVERY 6 HOURS PRN
Status: DISCONTINUED | OUTPATIENT
Start: 2024-07-22 | End: 2024-07-24 | Stop reason: HOSPADM

## 2024-07-22 RX ORDER — HEPARIN SODIUM 5000 [USP'U]/ML
5000 INJECTION, SOLUTION INTRAVENOUS; SUBCUTANEOUS EVERY 8 HOURS SCHEDULED
Status: DISCONTINUED | OUTPATIENT
Start: 2024-07-22 | End: 2024-07-24 | Stop reason: HOSPADM

## 2024-07-22 RX ORDER — LABETALOL HYDROCHLORIDE 5 MG/ML
10 INJECTION, SOLUTION INTRAVENOUS EVERY 4 HOURS PRN
Status: DISCONTINUED | OUTPATIENT
Start: 2024-07-22 | End: 2024-07-24 | Stop reason: HOSPADM

## 2024-07-22 RX ADMIN — CEFAZOLIN SODIUM 2000 MG: 2 SOLUTION INTRAVENOUS at 20:58

## 2024-07-22 RX ADMIN — OXYCODONE HYDROCHLORIDE 5 MG: 5 TABLET ORAL at 17:34

## 2024-07-22 RX ADMIN — OXYCODONE HYDROCHLORIDE 5 MG: 5 TABLET ORAL at 21:56

## 2024-07-22 RX ADMIN — IOHEXOL 100 ML: 350 INJECTION, SOLUTION INTRAVENOUS at 11:27

## 2024-07-22 RX ADMIN — CEFAZOLIN SODIUM 2000 MG: 2 SOLUTION INTRAVENOUS at 13:42

## 2024-07-22 RX ADMIN — HYDROMORPHONE HYDROCHLORIDE 0.5 MG: 1 INJECTION, SOLUTION INTRAMUSCULAR; INTRAVENOUS; SUBCUTANEOUS at 16:30

## 2024-07-22 RX ADMIN — HEPARIN SODIUM 5000 UNITS: 5000 INJECTION INTRAVENOUS; SUBCUTANEOUS at 21:09

## 2024-07-22 RX ADMIN — POTASSIUM CHLORIDE 40 MEQ: 1500 TABLET, EXTENDED RELEASE ORAL at 11:33

## 2024-07-22 RX ADMIN — Medication 10 ML: at 16:02

## 2024-07-22 RX ADMIN — SODIUM CHLORIDE, SODIUM LACTATE, POTASSIUM CHLORIDE, AND CALCIUM CHLORIDE 500 ML: .6; .31; .03; .02 INJECTION, SOLUTION INTRAVENOUS at 11:34

## 2024-07-22 NOTE — TELEMEDICINE
e-Consult (IPC)  - Interventional Radiology  Nae Szymanski 73 y.o. female MRN: [id number]  Unit/Bed#: ED-38 Encounter: [id number]          Interventional Radiology has been consulted to evaluate Nae Szymanski      Inpatient Consult to IR  Consult performed by: Susan Starks MD  Consult ordered by: Miguelangel Sandoval MD        07/22/24    Assessment/Recommendation:   Pt presented with right abd pain x 6 days.    CT shows right abd wall multiloculated fluid collection.  The components appear communicating.  Will place a pigtail drain with local.            11-20 minutes, >50% of the total time devoted to medical consultative verbal/EMR discussion between providers. Written report will be generated in the EMR.     Thank you for allowing Interventional Radiology to participate in the care of Nae Szymanski. Please don't hesitate to call or TigerText us with any questions.     Susan Starks MD

## 2024-07-22 NOTE — BRIEF OP NOTE (RAD/CATH)
INTERVENTIONAL RADIOLOGY PROCEDURE NOTE    Date: 7/22/2024    Procedure:   Procedure Summary       Date:  Room / Location:     Anesthesia Start:  Anesthesia Stop:     Procedure:  Diagnosis:     Scheduled Providers:  Responsible Provider:     Anesthesia Type: Not recorded ASA Status: Not recorded            Preoperative diagnosis:   1. Abdominal wall abscess         Postoperative diagnosis: Same.    Surgeon: Susan Starks MD     Assistant: None. No qualified resident was available.    Blood loss: Minimal    Specimens: 15 mL of pus sent for cultures.    Findings:    Successful placement of a 10 American pigtail drain for multiloculated right abdominal wall abscess.    Plan:  Flush drain with 10 cc of sterile normal saline once a day.  Return to IR for drain check in 1 week (I will place the order and IR scheduling will call her to make the appointment).    Complications: None immediate.    Anesthesia: local

## 2024-07-22 NOTE — DISCHARGE INSTRUCTIONS
"     TUBE CARE INSTRUCTIONS    Care after your procedure:    Resume your normal diet. Small sips of flat soda will help with nausea.    1. The properly functioning catheter should be forward flushed once (1x) daily with 10ml of normal saline using clean technique. You will be given a prescription for flushes.   To flush the tube, clean both connections with alcohol swab.Twist off the drainage bag/ bulb  tubing and twist the saline syringe into the drainage tube and flush. Remove the syringe and twist the drainage bag / bulb tubing tubing back on.    2. The drainage bag/bulb may be emptied as necessary. Keep a record of the amount of fluid you drain from your tube. This should be done with clean technique as well.     3. A fresh dressing should be applied daily over the tube insertion site.     4. As the tube is secured to the skin with only a suture,try not to pull on your tube. Tub baths are not permitted. Showers are permitted if the patient's skin entry site is prevented from getting wet. Similarly, washcloth \"baths\" are acceptable.     Contact Interventional Radiology at [phone] ([facility] PATIENTS: Contact Interventional Radiology at [phone]) ([facility] PATIENTS: Contact Interventional Radiology at [phone]) if:    1. Leakage or large amounts of liquid around the catheter.    2. Fever of 101 degrees lasting several hours without other obvious cause (such as sore throat, flu, etc).    3. Persistent nausea or vomiting.    4. Diminished drainage, which may be associated with pressure or pain. Or when the     drainage from your tube is less than 10mls for 48 hours.    5. Catheter pulled back or falls out.      The following pharmacies carry the flush syringes.       [facility]                     [facility] [address]                     [address] [place] [address]                         [phone]  Fawn Rosales " [place]  Phone [phone]            Phone [phone]                                                                                                       Víctor Farfan   [facility]             [facility]                                [phone]  [address] [place] [address] MELIZA HAIDER  Phone [phone]            Phone [phone]                      [facility]                                                                                                          [phone]  [facility]  [address]  Fawn HAIDER                                                                               [facility]  Phone [phone]

## 2024-07-22 NOTE — H&P
"Acute Care Surgery  History and Physical  Nae Szymanski 73 y.o. female MRN: [id number]  Unit/Bed#: ED-38 Encounter: [id number]    Assessment and Plan:  74 yo F with abdominal pain, found to have multiple rim enhancing collections in right lateral abdominal wall musculature, likely abscesses  AVSS, WBC 9    - admit to surgical service  - IR c/s for drain placement  - NPO for IR intervention  - IVF  - blood cx  - IV abx  - will get ECHO to r/o endocarditis       History of Present Illness   History, ROS and PFSH unobtainable from any source due to none.  HPI:  Nae Szymanski is a 73 y.o. female PMH HTN who presents with Right sided abdominal pain. Per pt, has had this pain consistently since last Tuesday night. Has had pain in the same area in the past but quickly resolves on its own. Pain does not radiate. Is worsened with movement. Is not worsened or alleviated with any other factors. Denies fevers but states has been having chills since symptoms started about 6 days ago. Denies nausea, vomiting, change in appetite, change in bowel/bladder habits. Was recently dx'd with NAFLD so has made changes to her diet and has lost \"a couple pounds\". Denies any unexplained weight loss. Brother had lung/liver/bowel cancer per pt, unsure what was primary. Denies any other family hx of cancer. Denies any cp, sob. Denies any recent trauma, heavy lifting, bruises, injections, recent travel. Pain is controlled when not moving.  Pt denies any recent cavities. Last colonoscopy 8 years ago.    Review of Systems   Constitutional:  Positive for chills. Negative for appetite change and fever.   Respiratory: Negative.     Cardiovascular: Negative.    Gastrointestinal:  Positive for abdominal pain. Negative for constipation, diarrhea, nausea and vomiting.   Genitourinary: Negative.    Skin: Negative.    Neurological: Negative.    All other systems reviewed and are negative.      Historical Information   History reviewed. No " "pertinent past medical history.  History reviewed. No pertinent surgical history.  Social History   Social History     Substance and Sexual Activity   Alcohol Use None     Social History     Substance and Sexual Activity   Drug Use Not on file     Social History     Tobacco Use   Smoking Status Never   Smokeless Tobacco Never     Family History: History reviewed. No pertinent family history.    Meds/Allergies   PTA meds:   Prior to Admission Medications   Prescriptions Last Dose Informant Patient Reported? Taking?   bisoprolol-hydrochlorothiazide (ZIAC) 2.5-6.25 MG per tablet   Yes No   Sig: Take 1 tablet by mouth daily      Facility-Administered Medications: None     Allergies   Allergen Reactions    Codeine Vomiting       Objective   First Vitals:   Blood Pressure: 135/82 (07/22/24 0838)  Pulse: 82 (07/22/24 0835)  Temperature: 98.5 °F (36.9 °C) (07/22/24 0835)  Temp Source: Oral (07/22/24 0835)  Respirations: 18 (07/22/24 0835)  Height: 5' 4\" (162.6 cm) (07/22/24 1307)  Weight - Scale: 94.3 kg (207 lb 14.3 oz) (07/22/24 1307)  SpO2: 98 % (07/22/24 0835)    Current Vitals:   Blood Pressure: 138/67 (07/22/24 1245)  Pulse: 69 (07/22/24 1245)  Temperature: 98.5 °F (36.9 °C) (07/22/24 0835)  Temp Source: Oral (07/22/24 0835)  Respirations: 18 (07/22/24 1245)  Height: 5' 4\" (162.6 cm) (07/22/24 1307)  Weight - Scale: 94.3 kg (207 lb 14.3 oz) (07/22/24 1307)  SpO2: 96 % (07/22/24 1245)      Intake/Output Summary (Last 24 hours) at 7/22/2024 1312  Last data filed at 7/22/2024 1246  Gross per 24 hour   Intake 500 ml   Output --   Net 500 ml       Invasive Devices       Peripheral Intravenous Line  Duration             Peripheral IV 07/22/24 Left Antecubital <1 day                    Physical Exam  Vitals and nursing note reviewed.   Constitutional:       General: She is not in acute distress.     Appearance: Normal appearance. She is normal weight. She is not ill-appearing, toxic-appearing or diaphoretic.   HENT:      " "Head: Normocephalic and atraumatic.   Eyes:      Extraocular Movements: Extraocular movements intact.   Cardiovascular:      Rate and Rhythm: Normal rate.   Pulmonary:      Effort: Pulmonary effort is normal. No respiratory distress.   Abdominal:      General: There is no distension.      Palpations: Abdomen is soft.      Tenderness: There is abdominal tenderness (tenderness in right lateral abdomen. over deep palpable mass). There is no guarding or rebound.   Musculoskeletal:         General: No swelling or tenderness.   Skin:     General: Skin is warm.      Capillary Refill: Capillary refill takes less than 2 seconds.   Neurological:      General: No focal deficit present.      Mental Status: She is alert and oriented to person, place, and time.   Psychiatric:         Mood and Affect: Mood normal.         Behavior: Behavior normal.         Lab Results: I have personally reviewed pertinent lab results.  , CBC:   Lab Results   Component Value Date    WBC 9.63 07/22/2024    HGB 13.9 07/22/2024    HCT 41.3 07/22/2024    MCV 89 07/22/2024     (H) 07/22/2024    RBC 4.62 07/22/2024    MCH 30.1 07/22/2024    MCHC 33.7 07/22/2024    RDW 12.5 07/22/2024    MPV 8.8 (L) 07/22/2024    NRBC 0 07/22/2024   , CMP:   Lab Results   Component Value Date    SODIUM 138 07/22/2024    K 3.2 (L) 07/22/2024    CL 98 07/22/2024    CO2 29 07/22/2024    BUN 16 07/22/2024    CREATININE 0.71 07/22/2024    CALCIUM 9.3 07/22/2024    AST 15 07/22/2024    ALT 17 07/22/2024    ALKPHOS 87 07/22/2024    EGFR 84 07/22/2024   , Coagulation: No results found for: \"PT\", \"INR\", \"APTT\", Urinalysis:   Lab Results   Component Value Date    COLORU Yellow 07/22/2024    CLARITYU Clear 07/22/2024    SPECGRAV 1.036 (H) 07/22/2024    PHUR 6.0 07/22/2024    LEUKOCYTESUR Negative 07/22/2024    NITRITE Negative 07/22/2024    GLUCOSEU Negative 07/22/2024    KETONESU 60 (2+) (A) 07/22/2024    BILIRUBINUR Negative 07/22/2024    BLOODU Negative 07/22/2024 "     Imaging: I have personally reviewed pertinent reports.    7/22 CTAP: IMPRESSION:  Multiple rim-enhancing collections involving the right lateral abdominal wall musculature suspicious for abscesses.    EKG, Pathology, and Other Studies: I have personally reviewed pertinent reports.      Code Status: Level 1 - Full Code  Advance Directive and Living Will:      Power of :    POLST:      Counseling / Coordination of Care  Total floor / unit time spent today 30 minutes. This involved direct patient contact where I performed a full history and physical, reviewed previous records, and reviewed laboratory and other diagnostic studies. Greater than 50% of total time was spent with the patient and / or family counseling and / or coordination of care.    Christen Barrera PA-C  7/22/2024

## 2024-07-22 NOTE — PLAN OF CARE
Problem: PAIN - ADULT  Goal: Verbalizes/displays adequate comfort level or baseline comfort level  Description: Interventions:  - Encourage patient to monitor pain and request assistance  - Assess pain using appropriate pain scale  - Administer analgesics based on type and severity of pain and evaluate response  - Implement non-pharmacological measures as appropriate and evaluate response  - Consider cultural and social influences on pain and pain management  - Notify physician/advanced practitioner if interventions unsuccessful or patient reports new pain  Outcome: Progressing     Problem: INFECTION - ADULT  Goal: Absence or prevention of progression during hospitalization  Description: INTERVENTIONS:  - Assess and monitor for signs and symptoms of infection  - Monitor lab/diagnostic results  - Monitor all insertion sites, i.e. indwelling lines, tubes, and drains  - Monitor endotracheal if appropriate and nasal secretions for changes in amount and color  - [place] appropriate cooling/warming therapies per order  - Administer medications as ordered  - Instruct and encourage patient and family to use good hand hygiene technique  - Identify and instruct in appropriate isolation precautions for identified infection/condition  Outcome: Progressing  Goal: Absence of fever/infection during neutropenic period  Description: INTERVENTIONS:  - Monitor WBC    Outcome: Progressing     Problem: SAFETY ADULT  Goal: Patient will remain free of falls  Description: INTERVENTIONS:  - Educate patient/family on patient safety including physical limitations  - Instruct patient to call for assistance with activity   - Consult OT/PT to assist with strengthening/mobility   - Keep Call bell within reach  - Keep bed low and locked with side rails adjusted as appropriate  - Keep care items and personal belongings within reach  - Initiate and maintain comfort rounds  - Make Fall Risk Sign visible to staff  - Offer Toileting every  Hours,  in advance of need  - Initiate/Maintain alarm  - Obtain necessary fall risk management equipment:   - Apply yellow socks and bracelet for high fall risk patients  - Consider moving patient to room near nurses station  Outcome: Progressing  Goal: Maintain or return to baseline ADL function  Description: INTERVENTIONS:  -  Assess patient's ability to carry out ADLs; assess patient's baseline for ADL function and identify physical deficits which impact ability to perform ADLs (bathing, care of mouth/teeth, toileting, grooming, dressing, etc.)  - Assess/evaluate cause of self-care deficits   - Assess range of motion  - Assess patient's mobility; develop plan if impaired  - Assess patient's need for assistive devices and provide as appropriate  - Encourage maximum independence but intervene and supervise when necessary  - Involve family in performance of ADLs  - Assess for home care needs following discharge   - Consider OT consult to assist with ADL evaluation and planning for discharge  - Provide patient education as appropriate  Outcome: Progressing  Goal: Maintains/Returns to pre admission functional level  Description: INTERVENTIONS:  - Perform AM-PAC 6 Click Basic Mobility/ Daily Activity assessment daily.  - Set and communicate daily mobility goal to care team and patient/family/caregiver.   - Collaborate with rehabilitation services on mobility goals if consulted  - Perform Range of Motion  times a day.  - Reposition patient every  hours.  - Dangle patient  times a day  - Stand patient  times a day  - Ambulate patient  times a day  - Out of bed to chair  times a day   - Out of bed for meals  times a day  - Out of bed for toileting  - Record patient progress and toleration of activity level   Outcome: Progressing     Problem: DISCHARGE PLANNING  Goal: Discharge to home or other facility with appropriate resources  Description: INTERVENTIONS:  - Identify barriers to discharge w/patient and caregiver  - Arrange for  needed discharge resources and transportation as appropriate  - Identify discharge learning needs (meds, wound care, etc.)  - Arrange for interpretive services to assist at discharge as needed  - Refer to Case Management Department for coordinating discharge planning if the patient needs post-hospital services based on physician/advanced practitioner order or complex needs related to functional status, cognitive ability, or social support system  Outcome: Progressing     Problem: Knowledge Deficit  Goal: Patient/family/caregiver demonstrates understanding of disease process, treatment plan, medications, and discharge instructions  Description: Complete learning assessment and assess knowledge base.  Interventions:  - Provide teaching at level of understanding  - Provide teaching via preferred learning methods  Outcome: Progressing

## 2024-07-22 NOTE — ED PROVIDER NOTES
History  Chief Complaint   Patient presents with    Abdominal Pain     Patient reports she was seen in may and was told she had enlarged liver, had some pain here and there in RUQ and now since Tuesday reports unrelieved pain.     73-year-old female coming into the ED for evaluation of right-sided abdominal pain for the past 6 days, constant but fluctuating intensity.  No other associated symptoms.  She states 2 months ago she was in the ED with diverticulitis and was treated with antibiotics with improvement.  This pain is different, more similar to pain she had while examined in the ED 2 months ago in the right upper quadrant.  She was told she had hepatomegaly and fatty liver and is trying to make some dietary adjustments.      History provided by:  Patient   used: No    Abdominal Pain      Prior to Admission Medications   Prescriptions Last Dose Informant Patient Reported? Taking?   bisoprolol-hydrochlorothiazide (ZIAC) 2.5-6.25 MG per tablet   Yes No   Sig: Take 1 tablet by mouth daily      Facility-Administered Medications: None       History reviewed. No pertinent past medical history.    History reviewed. No pertinent surgical history.    History reviewed. No pertinent family history.  I have reviewed and agree with the history as documented.    E-Cigarette/Vaping     E-Cigarette/Vaping Substances     Social History     Tobacco Use    Smoking status: Never    Smokeless tobacco: Never       Review of Systems   Gastrointestinal:  Positive for abdominal pain.   All other systems reviewed and are negative.      Physical Exam  Physical Exam  Vitals and nursing note reviewed.   Constitutional:       General: She is not in acute distress.     Appearance: Normal appearance. She is well-developed and normal weight. She is not ill-appearing, toxic-appearing or diaphoretic.   HENT:      Head: Normocephalic and atraumatic.      Right Ear: External ear normal.      Left Ear: External ear normal.       Nose: Nose normal.      Mouth/Throat:      Mouth: Mucous membranes are moist.      Pharynx: Oropharynx is clear.   Eyes:      Extraocular Movements: Extraocular movements intact.      Conjunctiva/sclera: Conjunctivae normal.   Cardiovascular:      Rate and Rhythm: Normal rate and regular rhythm.      Pulses: Normal pulses.      Heart sounds: Normal heart sounds.   Pulmonary:      Effort: Pulmonary effort is normal.      Breath sounds: Normal breath sounds.   Abdominal:      General: Abdomen is flat. Bowel sounds are normal. There is no distension or abdominal bruit. There are no signs of injury.      Palpations: Abdomen is soft. There is no shifting dullness.      Tenderness: There is abdominal tenderness in the right upper quadrant and right lower quadrant. There is no right CVA tenderness, left CVA tenderness, guarding or rebound. Negative signs include Broderick's sign, Rovsing's sign and McBurney's sign.      Hernia: No hernia is present.   Genitourinary:     Adnexa: Right adnexa normal and left adnexa normal.   Musculoskeletal:         General: Normal range of motion.      Cervical back: Normal range of motion and neck supple.   Skin:     General: Skin is warm and dry.      Capillary Refill: Capillary refill takes less than 2 seconds.   Neurological:      General: No focal deficit present.      Mental Status: She is alert and oriented to person, place, and time. Mental status is at baseline.      Cranial Nerves: No cranial nerve deficit.      Motor: No weakness.   Psychiatric:         Mood and Affect: Mood normal.         Behavior: Behavior normal.         Vital Signs  ED Triage Vitals   Temperature Pulse Respirations Blood Pressure SpO2   07/22/24 0835 07/22/24 0835 07/22/24 0835 07/22/24 0838 07/22/24 0835   98.5 °F (36.9 °C) 82 18 135/82 98 %      Temp Source Heart Rate Source Patient Position - Orthostatic VS BP Location FiO2 (%)   07/22/24 0835 07/22/24 0835 07/22/24 0838 07/22/24 0838 --   Oral Monitor  Sitting Right arm       Pain Score       --                  Vitals:    07/22/24 0835 07/22/24 0838 07/22/24 0900 07/22/24 1245   BP:  135/82 135/80 138/67   Pulse: 82  75 69   Patient Position - Orthostatic VS:  Sitting           Visual Acuity      ED Medications  Medications   ceFAZolin (ANCEF) IVPB (premix in dextrose) 2,000 mg 50 mL (2,000 mg Intravenous New Bag 7/22/24 1342)   lactated ringers infusion (has no administration in time range)   ondansetron (ZOFRAN) injection 4 mg (has no administration in time range)   oxyCODONE (ROXICODONE) IR tablet 5 mg (has no administration in time range)   oxyCODONE (ROXICODONE) split tablet 2.5 mg (has no administration in time range)   acetaminophen (TYLENOL) tablet 975 mg (has no administration in time range)   bisoprolol (ZEBETA) tablet 2.5 mg (has no administration in time range)   labetalol (NORMODYNE) injection 10 mg (has no administration in time range)   heparin (porcine) subcutaneous injection 5,000 Units (has no administration in time range)   potassium chloride (Klor-Con M20) CR tablet 40 mEq (40 mEq Oral Given 7/22/24 1133)   lactated ringers bolus 500 mL (0 mL Intravenous Stopped 7/22/24 1246)   iohexol (OMNIPAQUE) 350 MG/ML injection (MULTI-DOSE) 100 mL (100 mL Intravenous Given 7/22/24 1127)       Diagnostic Studies  Results Reviewed       Procedure Component Value Units Date/Time    Blood culture [[id number]] Collected: 07/22/24 1329    Lab Status: In process Specimen: Blood from Arm, Left Updated: 07/22/24 1333    MRSA culture [[id number]] Collected: 07/22/24 1329    Lab Status: In process Specimen: Nares from Nose Updated: 07/22/24 1333    Blood culture [[id number]] Collected: 07/22/24 1329    Lab Status: In process Specimen: Blood from Arm, Right Updated: 07/22/24 1332    Urine Microscopic [[id number]]  (Abnormal) Collected: 07/22/24 1133    Lab Status: Final result Specimen: Urine, Clean Catch Updated: 07/22/24 1152     RBC, UA 1-2 /hpf      WBC, UA 1-2 /hpf       Epithelial Cells Occasional /hpf      Bacteria, UA None Seen /hpf      MUCUS THREADS Moderate    UA w Reflex to Microscopic w Reflex to Culture [[id number]]  (Abnormal) Collected: 07/22/24 1133    Lab Status: Final result Specimen: Urine, Clean Catch Updated: 07/22/24 1150     Color, UA Yellow     Clarity, UA Clear     Specific Gravity, UA 1.036     pH, UA 6.0     Leukocytes, UA Negative     Nitrite, UA Negative     Protein, UA Trace mg/dl      Glucose, UA Negative mg/dl      Ketones, UA 60 (2+) mg/dl      Urobilinogen, UA 3.0 mg/dl      Bilirubin, UA Negative     Occult Blood, UA Negative    Lipase [[id number]]  (Normal) Collected: 07/22/24 1013    Lab Status: Final result Specimen: Blood from Arm, Left Updated: 07/22/24 1036     Lipase 21 u/L     CMP [[id number]]  (Abnormal) Collected: 07/22/24 1013    Lab Status: Final result Specimen: Blood from Arm, Left Updated: 07/22/24 1036     Sodium 138 mmol/L      Potassium 3.2 mmol/L      Chloride 98 mmol/L      CO2 29 mmol/L      ANION GAP 11 mmol/L      BUN 16 mg/dL      Creatinine 0.71 mg/dL      Glucose 110 mg/dL      Calcium 9.3 mg/dL      AST 15 U/L      ALT 17 U/L      Alkaline Phosphatase 87 U/L      Total Protein 7.1 g/dL      Albumin 3.9 g/dL      Total Bilirubin 1.04 mg/dL      eGFR 84 ml/min/1.73sq m     Narrative:      National Kid[employer] Disease Foundation guidelines for Chronic Kidney Disease (CKD):     Stage 1 with normal or high GFR (GFR > 90 mL/min/1.73 square meters)    Stage 2 Mild CKD (GFR = 60-89 mL/min/1.73 square meters)    Stage 3A Moderate CKD (GFR = 45-59 mL/min/1.73 square meters)    Stage 3B Moderate CKD (GFR = 30-44 mL/min/1.73 square meters)    Stage 4 Severe CKD (GFR = 15-29 mL/min/1.73 square meters)    Stage 5 End Stage CKD (GFR <15 mL/min/1.73 square meters)  Note: GFR calculation is accurate only with a steady state creatinine    CBC and differential [[id number]]  (Abnormal) Collected: 07/22/24 1013    Lab Status: Final result Specimen:  Blood from Arm, Left Updated: 07/22/24 1022     WBC 9.63 Thousand/uL      RBC 4.62 Million/uL      Hemoglobin 13.9 g/dL      Hematocrit 41.3 %      MCV 89 fL      MCH 30.1 pg      MCHC 33.7 g/dL      RDW 12.5 %      MPV 8.8 fL      Platelets 432 Thousands/uL      nRBC 0 /100 WBCs      Segmented % 77 %      Immature Grans % 1 %      Lymphocytes % 13 %      Monocytes % 8 %      Eosinophils Relative 1 %      Basophils Relative 0 %      Absolute Neutrophils 7.42 Thousands/µL      Absolute Immature Grans 0.05 Thousand/uL      Absolute Lymphocytes 1.29 Thousands/µL      Absolute Monocytes 0.78 Thousand/µL      Eosinophils Absolute 0.06 Thousand/µL      Basophils Absolute 0.03 Thousands/µL                    XR chest portable   Final Result by Kajal Blackman MD (07/22 1336)      No acute cardiopulmonary disease.            Workstation performed: [id number]         CT Abdomen pelvis with contrast   Final Result by Teo Palma MD (07/22 1142)      Multiple rim-enhancing collections involving the right lateral abdominal wall musculature suspicious for abscesses.      The study was marked in EPIC for immediate notification.         Workstation performed: [id number]         IR drainage tube placement    (Results Pending)              Procedures  Procedures         ED Course  ED Course as of 07/22/24 1405   Mon Jul 22, 2024   1059 Mild hypokalemia noted at 3.2, will replace with 40 mEq   1209 Multiple rim-enhancing collections involving the right lateral abdominal wall musculature suspicious for abscesses.    Will consult gen surg for further evaluation.   1219 D/w surgery, diane segovia, will be down to eval   1311 Surgery admitting under Dr Garcia.                                 SBIRT 20yo+      Flowsheet Row Most Recent Value   Initial Alcohol Screen: US AUDIT-C     1. How often do you have a drink containing alcohol? 0 Filed at: 07/22/2024 1141   2. How many drinks containing alcohol do you have on a typical day  you are drinking?  0 Filed at: 07/22/2024 1141   3b. FEMALE Any Age, or MALE 65+: How often do you have 4 or more drinks on one occassion? 0 Filed at: 07/22/2024 1141   Audit-C Score 0 Filed at: 07/22/2024 1141   EL: How many times in the past year have you...    Used an illegal drug or used a prescription medication for non-medical reasons? Never Filed at: 07/22/2024 1141                      Medical Decision Making  Amount and/or Complexity of Data Reviewed  Labs: ordered.  Radiology: ordered.    Risk  Prescription drug management.  Decision regarding hospitalization.                 Disposition  Final diagnoses:   Abdominal wall abscess     Time reflects when diagnosis was documented in both MDM as applicable and the Disposition within this note       Time User Action Codes Description Comment    7/22/2024  1:04 PM Miguelangel Sandoval Add [L02.211] Abdominal wall abscess     7/22/2024  2:04 PM Parish Menon Modify [L02.211] Abdominal wall abscess           ED Disposition       ED Disposition   Admit    Condition   Stable    Date/Time   Mon Jul 22, 2024 8407    Comment   Case was discussed with Christen Barrera and the patient's admission status was agreed to be Admission Status: inpatient status to the service of Dr. Cheng .               Follow-up Information    None         Patient's Medications   Discharge Prescriptions    No medications on file       No discharge procedures on file.    PDMP Review       None            ED Provider  Electronically Signed by             Parish Menon DO  07/22/24 4253

## 2024-07-23 LAB
BASOPHILS # BLD AUTO: 0.03 THOUSANDS/ÂΜL (ref 0–0.1)
BASOPHILS NFR BLD AUTO: 0 % (ref 0–1)
EOSINOPHIL # BLD AUTO: 0.04 THOUSAND/ÂΜL (ref 0–0.61)
EOSINOPHIL NFR BLD AUTO: 0 % (ref 0–6)
ERYTHROCYTE [DISTWIDTH] IN BLOOD BY AUTOMATED COUNT: 12.6 % (ref 11.6–15.1)
HCT VFR BLD AUTO: 38.5 % (ref 34.8–46.1)
HGB BLD-MCNC: 13.1 G/DL (ref 11.5–15.4)
IMM GRANULOCYTES # BLD AUTO: 0.07 THOUSAND/UL (ref 0–0.2)
IMM GRANULOCYTES NFR BLD AUTO: 1 % (ref 0–2)
LYMPHOCYTES # BLD AUTO: 1.21 THOUSANDS/ÂΜL (ref 0.6–4.47)
LYMPHOCYTES NFR BLD AUTO: 13 % (ref 14–44)
MCH RBC QN AUTO: 30.4 PG (ref 26.8–34.3)
MCHC RBC AUTO-ENTMCNC: 34 G/DL (ref 31.4–37.4)
MCV RBC AUTO: 89 FL (ref 82–98)
MONOCYTES # BLD AUTO: 0.69 THOUSAND/ÂΜL (ref 0.17–1.22)
MONOCYTES NFR BLD AUTO: 7 % (ref 4–12)
MRSA NOSE QL CULT: NORMAL
NEUTROPHILS # BLD AUTO: 7.38 THOUSANDS/ÂΜL (ref 1.85–7.62)
NEUTS SEG NFR BLD AUTO: 79 % (ref 43–75)
NRBC BLD AUTO-RTO: 0 /100 WBCS
PLATELET # BLD AUTO: 442 THOUSANDS/UL (ref 149–390)
PMV BLD AUTO: 9.5 FL (ref 8.9–12.7)
RBC # BLD AUTO: 4.31 MILLION/UL (ref 3.81–5.12)
WBC # BLD AUTO: 9.42 THOUSAND/UL (ref 4.31–10.16)

## 2024-07-23 PROCEDURE — 85025 COMPLETE CBC W/AUTO DIFF WBC: CPT | Performed by: SURGERY

## 2024-07-23 PROCEDURE — 99232 SBSQ HOSP IP/OBS MODERATE 35: CPT | Performed by: SURGERY

## 2024-07-23 PROCEDURE — 99244 OFF/OP CNSLTJ NEW/EST MOD 40: CPT | Performed by: INTERNAL MEDICINE

## 2024-07-23 RX ORDER — OXYCODONE HYDROCHLORIDE 5 MG/1
5 TABLET ORAL EVERY 4 HOURS PRN
Status: DISCONTINUED | OUTPATIENT
Start: 2024-07-23 | End: 2024-07-24 | Stop reason: HOSPADM

## 2024-07-23 RX ORDER — HYDROMORPHONE HCL/PF 1 MG/ML
0.5 SYRINGE (ML) INJECTION
Status: DISCONTINUED | OUTPATIENT
Start: 2024-07-23 | End: 2024-07-24 | Stop reason: HOSPADM

## 2024-07-23 RX ORDER — ACETAMINOPHEN 325 MG/1
975 TABLET ORAL EVERY 6 HOURS SCHEDULED
Status: DISCONTINUED | OUTPATIENT
Start: 2024-07-23 | End: 2024-07-24 | Stop reason: HOSPADM

## 2024-07-23 RX ORDER — IBUPROFEN 400 MG/1
400 TABLET ORAL EVERY 8 HOURS SCHEDULED
Status: DISCONTINUED | OUTPATIENT
Start: 2024-07-23 | End: 2024-07-24 | Stop reason: HOSPADM

## 2024-07-23 RX ORDER — OXYCODONE HYDROCHLORIDE 10 MG/1
10 TABLET ORAL EVERY 4 HOURS PRN
Status: DISCONTINUED | OUTPATIENT
Start: 2024-07-23 | End: 2024-07-24 | Stop reason: HOSPADM

## 2024-07-23 RX ADMIN — CEFAZOLIN SODIUM 2000 MG: 2 SOLUTION INTRAVENOUS at 21:28

## 2024-07-23 RX ADMIN — HEPARIN SODIUM 5000 UNITS: 5000 INJECTION INTRAVENOUS; SUBCUTANEOUS at 21:28

## 2024-07-23 RX ADMIN — HEPARIN SODIUM 5000 UNITS: 5000 INJECTION INTRAVENOUS; SUBCUTANEOUS at 05:01

## 2024-07-23 RX ADMIN — IBUPROFEN 400 MG: 400 TABLET, FILM COATED ORAL at 14:15

## 2024-07-23 RX ADMIN — OXYCODONE HYDROCHLORIDE 5 MG: 5 TABLET ORAL at 04:15

## 2024-07-23 RX ADMIN — IBUPROFEN 400 MG: 400 TABLET, FILM COATED ORAL at 21:28

## 2024-07-23 RX ADMIN — CEFAZOLIN SODIUM 2000 MG: 2 SOLUTION INTRAVENOUS at 14:16

## 2024-07-23 RX ADMIN — HEPARIN SODIUM 5000 UNITS: 5000 INJECTION INTRAVENOUS; SUBCUTANEOUS at 14:15

## 2024-07-23 RX ADMIN — ACETAMINOPHEN 975 MG: 325 TABLET, FILM COATED ORAL at 11:41

## 2024-07-23 RX ADMIN — OXYCODONE HYDROCHLORIDE 5 MG: 5 TABLET ORAL at 21:28

## 2024-07-23 RX ADMIN — OXYCODONE HYDROCHLORIDE 5 MG: 5 TABLET ORAL at 08:20

## 2024-07-23 RX ADMIN — ACETAMINOPHEN 975 MG: 325 TABLET, FILM COATED ORAL at 17:07

## 2024-07-23 RX ADMIN — BISOPROLOL FUMARATE 2.5 MG: 5 TABLET ORAL at 08:20

## 2024-07-23 RX ADMIN — CEFAZOLIN SODIUM 2000 MG: 2 SOLUTION INTRAVENOUS at 04:15

## 2024-07-23 NOTE — PROGRESS NOTES
"Progress Note  Nae Szymanski 73 y.o. female MRN: [id number]  Unit/Bed#: S -01 Encounter: [id number]    Assessment:  73F with multiple abscesses in R lateral abdominal wall musculature of unknown etiology. S/p 7/22 IR drain placed.    Plan:  - regular diet  - f/u bcx, abscess cx -> depending on growth, may need MELVINA  - pending growth/labs, d/c home on keflex til 7/26  - outpatient colonoscopy  - IR drain check in 1w  - DVT ppx    Subjective/Objective   NAOE. Reports LLQ pain. Tolerating diet, no n/v. No BM, no flatus. Voiding. Walking.    Physical Exam:  General: NAD  CV: nl rate  Lungs: nl wob. No resp distress. on RA.  ABD: Soft, ND, LLQ tenderness near drain. IR drain with murky ss output.  Extrem: No CCE    Patient Vitals for the past 24 hrs:   BP Temp Temp src Pulse Resp SpO2 Height Weight   07/22/24 2309 121/67 97.9 °F (36.6 °C) -- 69 16 91 % -- --   07/22/24 1733 128/76 98.9 °F (37.2 °C) -- 70 17 (!) 89 % -- --   07/22/24 1657 140/77 -- -- 72 18 95 % -- --   07/22/24 1615 126/72 -- -- 72 -- 95 % -- --   07/22/24 1540 121/83 -- -- 78 -- 95 % -- --   07/22/24 1500 138/67 -- -- 78 -- -- 5' 4\" (1.626 m) 93.9 kg (207 lb)   07/22/24 1307 -- -- -- -- -- -- 5' 4\" (1.626 m) 94.3 kg (207 lb 14.3 oz)   07/22/24 1245 138/67 -- -- 69 18 96 % -- --   07/22/24 0900 135/80 -- -- 75 17 96 % -- --   07/22/24 0838 135/82 -- -- -- -- -- -- --   07/22/24 0835 -- 98.5 °F (36.9 °C) Oral 82 18 98 % -- --       I/O         07/21 0701 07/22 0700 07/22 0701 07/23 0700    I.V. (mL/kg)  10 (0.1)    IV Piggyback  500    Total Intake(mL/kg)  510 (5.4)    Drains  40    Total Output  40    Net  +470                  Recent Labs     07/22/24  1013   WBC 9.63   HGB 13.9   *   SODIUM 138   K 3.2*   CL 98   CO2 29   BUN 16   CREATININE 0.71   GLUC 110   CALCIUM 9.3   AST 15   ALT 17   ALKPHOS 87   TBILI 1.04*   LIPASE 21   EGFR 84     Lab Results   Component Value Date    BLOODCX Received in Microbiology Lab. Culture in " Progress. 07/22/2024    BLOODCX Received in Microbiology Lab. Culture in Progress. 07/22/2024    LEUKOCYTESUR Negative 07/22/2024

## 2024-07-23 NOTE — PLAN OF CARE
Problem: PAIN - ADULT  Goal: Verbalizes/displays adequate comfort level or baseline comfort level  Description: Interventions:  - Encourage patient to monitor pain and request assistance  - Assess pain using appropriate pain scale  - Administer analgesics based on type and severity of pain and evaluate response  - Implement non-pharmacological measures as appropriate and evaluate response  - Consider cultural and social influences on pain and pain management  - Notify physician/advanced practitioner if interventions unsuccessful or patient reports new pain  Outcome: Progressing     Problem: INFECTION - ADULT  Goal: Absence or prevention of progression during hospitalization  Description: INTERVENTIONS:  - Assess and monitor for signs and symptoms of infection  - Monitor lab/diagnostic results  - Monitor all insertion sites, i.e. indwelling lines, tubes, and drains  - Monitor endotracheal if appropriate and nasal secretions for changes in amount and color  - [place] appropriate cooling/warming therapies per order  - Administer medications as ordered  - Instruct and encourage patient and family to use good hand hygiene technique  - Identify and instruct in appropriate isolation precautions for identified infection/condition  Outcome: Progressing  Goal: Absence of fever/infection during neutropenic period  Description: INTERVENTIONS:  - Monitor WBC    Outcome: Progressing     Problem: SAFETY ADULT  Goal: Patient will remain free of falls  Description: INTERVENTIONS:  - Educate patient/family on patient safety including physical limitations  - Instruct patient to call for assistance with activity   - Consult OT/PT to assist with strengthening/mobility   - Keep Call bell within reach  - Keep bed low and locked with side rails adjusted as appropriate  - Keep care items and personal belongings within reach  - Initiate and maintain comfort rounds  - Make Fall Risk Sign visible to staff  - Offer Toileting every  Hours,  in advance of need  - Initiate/Maintain alarm  - Obtain necessary fall risk management equipment:   - Apply yellow socks and bracelet for high fall risk patients  - Consider moving patient to room near nurses station  Outcome: Progressing  Goal: Maintain or return to baseline ADL function  Description: INTERVENTIONS:  -  Assess patient's ability to carry out ADLs; assess patient's baseline for ADL function and identify physical deficits which impact ability to perform ADLs (bathing, care of mouth/teeth, toileting, grooming, dressing, etc.)  - Assess/evaluate cause of self-care deficits   - Assess range of motion  - Assess patient's mobility; develop plan if impaired  - Assess patient's need for assistive devices and provide as appropriate  - Encourage maximum independence but intervene and supervise when necessary  - Involve family in performance of ADLs  - Assess for home care needs following discharge   - Consider OT consult to assist with ADL evaluation and planning for discharge  - Provide patient education as appropriate  Outcome: Progressing  Goal: Maintains/Returns to pre admission functional level  Description: INTERVENTIONS:  - Perform AM-PAC 6 Click Basic Mobility/ Daily Activity assessment daily.  - Set and communicate daily mobility goal to care team and patient/family/caregiver.   - Collaborate with rehabilitation services on mobility goals if consulted  - Perform Range of Motion  times a day.  - Reposition patient every  hours.  - Dangle patient  times a day  - Stand patient  times a day  - Ambulate patient  times a day  - Out of bed to chair  times a day   - Out of bed for meals times a day  - Out of bed for toileting  - Record patient progress and toleration of activity level   Outcome: Progressing     Problem: DISCHARGE PLANNING  Goal: Discharge to home or other facility with appropriate resources  Description: INTERVENTIONS:  - Identify barriers to discharge w/patient and caregiver  - Arrange for  needed discharge resources and transportation as appropriate  - Identify discharge learning needs (meds, wound care, etc.)  - Arrange for interpretive services to assist at discharge as needed  - Refer to Case Management Department for coordinating discharge planning if the patient needs post-hospital services based on physician/advanced practitioner order or complex needs related to functional status, cognitive ability, or social support system  Outcome: Progressing     Problem: Knowledge Deficit  Goal: Patient/family/caregiver demonstrates understanding of disease process, treatment plan, medications, and discharge instructions  Description: Complete learning assessment and assess knowledge base.  Interventions:  - Provide teaching at level of understanding  - Provide teaching via preferred learning methods  Outcome: Progressing

## 2024-07-23 NOTE — UTILIZATION REVIEW
Initial Clinical Review    Admission: Date/Time/Statement:   Admission Orders (From admission, onward)       Ordered        07/22/24 1429  Place in Observation  Once            07/22/24 1309  Inpatient Admission  Once,   Status:  Canceled                          Orders Placed This Encounter   Procedures    Place in Observation     Standing Status:   Standing     Number of Occurrences:   1     Order Specific Question:   Level of Care     Answer:   Med Surg [16]     ED Arrival Information       Expected   -    Arrival   7/22/2024 08:20    Acuity   Urgent              Means of arrival   Walk-In    Escorted by   Self    Service   Surgery-General    Admission type   Emergency              Arrival complaint   FLANK PAIN             Chief Complaint   Patient presents with    Abdominal Pain     Patient reports she was seen in may and was told she had enlarged liver, had some pain here and there in RUQ and now since Tuesday reports unrelieved pain.       Initial Presentation: 73 y.o. female PMH HTN presents with R sided abdominal pain with chills/malaise x6 days. ED work-up with CT scan demonstrating multiple rim-enhancing collections of different shapes/sizes of the R abdominal wall c/w abscesses. There are no skin changes or induration overlying these abscesses. WBC normal at 9.63. BMP unremarkable except for K of 3.2.     Etiology work-up ongoing - MELVINA performed - negative for vegetations. Looking at her prior CT scan in 5/24 when she had diverticulitis, there is evidence of tiny ?fluid collection in the same area where she now has an abscesses. This was not near the diverticulitis.      Plan:   Admit to surgery  IR consulted for IR drain placement into abdominal wall abscesses  Ancef, follow-up drain cultures      ADMIT OBSERVATION STATUS    Anticipated Length of Stay/Certification Statement:      Date:     Day 2:      ED Triage Vitals   Temperature Pulse Respirations Blood Pressure SpO2 Pain Score   07/22/24 0835  07/22/24 0835 07/22/24 0835 07/22/24 0838 07/22/24 0835 07/22/24 1538   98.5 °F (36.9 °C) 82 18 135/82 98 % No Pain     Weight (last 2 days)       Date/Time Weight    07/22/24 1500 93.9 (207)    07/22/24 1307 94.3 (207.89)            Vital Signs (last 3 days)       Date/Time Temp Pulse Resp BP MAP (mmHg) SpO2 O2 Device Patient Position - Orthostatic VS Pain    07/23/24 0820 -- -- -- -- -- -- -- -- 4    07/23/24 07:51:32 98 °F (36.7 °C) 72 20 131/73 92 93 % -- -- --    07/23/24 0415 -- -- -- -- -- -- -- -- 8    07/22/24 23:09:57 97.9 °F (36.6 °C) 69 16 121/67 85 91 % -- -- --    07/22/24 2156 -- -- -- -- -- -- -- -- 6    07/22/24 1838 -- -- -- -- -- -- -- -- 3    07/22/24 1734 -- -- -- -- -- -- -- -- 3    07/22/24 17:33:26 98.9 °F (37.2 °C) 70 17 128/76 93 89 % -- -- --    07/22/24 1657 -- 72 18 140/77 102 95 % None (Room air) Lying --    07/22/24 1630 -- -- -- -- -- -- -- -- 10 - Worst Possible Pain    07/22/24 16:15:57 -- 72 -- 126/72 -- 95 % -- -- --    07/22/24 15:40:09 -- 78 -- 121/83 -- 95 % -- -- --    07/22/24 1538 -- -- -- -- -- -- -- -- No Pain    07/22/24 1500 -- 78 -- 138/67 -- -- -- -- --    07/22/24 1245 -- 69 18 138/67 96 96 % -- -- --    07/22/24 0900 -- 75 17 135/80 -- 96 % -- -- --    07/22/24 0838 -- -- -- 135/82 -- -- -- Sitting --    07/22/24 0835 98.5 °F (36.9 °C) 82 18 -- -- 98 % None (Room air) -- --              Pertinent Labs/Diagnostic Test Results:   Radiology:  XR chest portable   Final Interpretation by Kajal Blackman MD (07/22 1336)      No acute cardiopulmonary disease.            Workstation performed: [id number]         CT Abdomen pelvis with contrast   Final Interpretation by Teo Palma MD (07/22 1142)      Multiple rim-enhancing collections involving the right lateral abdominal wall musculature suspicious for abscesses.      The study was marked in EPIC for immediate notification.         Workstation performed: [id number]         IR drainage tube placement    (Results  "Pending)   IR drainage tube check/change/reposition/reinsertion/upsize    (Results Pending)     Cardiology:  No orders to display     GI:  No orders to display           Results from last 7 days   Lab Units 07/22/24  1013   WBC Thousand/uL 9.63   HEMOGLOBIN g/dL 13.9   HEMATOCRIT % 41.3   PLATELETS Thousands/uL 432*   TOTAL NEUT ABS Thousands/µL 7.42         Results from last 7 days   Lab Units 07/22/24  1013   SODIUM mmol/L 138   POTASSIUM mmol/L 3.2*   CHLORIDE mmol/L 98   CO2 mmol/L 29   ANION GAP mmol/L 11   BUN mg/dL 16   CREATININE mg/dL 0.71   EGFR ml/min/1.73sq m 84   CALCIUM mg/dL 9.3     Results from last 7 days   Lab Units 07/22/24  1013   AST U/L 15   ALT U/L 17   ALK PHOS U/L 87   TOTAL PROTEIN g/dL 7.1   ALBUMIN g/dL 3.9   TOTAL BILIRUBIN mg/dL 1.04*         Results from last 7 days   Lab Units 07/22/24  1013   GLUCOSE RANDOM mg/dL 110             No results found for: \"BETA-HYDROXYBUTYRATE\"                                                                       Results from last 7 days   Lab Units 07/22/24  1013   LIPASE u/L 21                 Results from last 7 days   Lab Units 07/22/24  1133   CLARITY UA  Clear   COLOR UA  Yellow   SPEC GRAV UA  1.036*   PH UA  6.0   GLUCOSE UA mg/dl Negative   KETONES UA mg/dl 60 (2+)*   BLOOD UA  Negative   PROTEIN UA mg/dl Trace*   NITRITE UA  Negative   BILIRUBIN UA  Negative   UROBILINOGEN UA (BE) mg/dl 3.0*   LEUKOCYTES UA  Negative   WBC UA /hpf 1-2   RBC UA /hpf 1-2   BACTERIA UA /hpf None Seen   EPITHELIAL CELLS WET PREP /hpf Occasional   MUCUS THREADS  Moderate*                                 Results from last 7 days   Lab Units 07/22/24  1615 07/22/24  1329   BLOOD CULTURE   --  Received in Microbiology Lab. Culture in Progress.  Received in Microbiology Lab. Culture in Progress.   GRAM STAIN RESULT  3+ Polys  No bacteria seen  --                    ED Treatment-Medication Administration from 07/22/2024 0820 to 07/22/2024 1709         Date/Time Order " Dose Route Action     07/22/2024 1133 potassium chloride (Klor-Con M20) CR tablet 40 mEq 40 mEq Oral Given     07/22/2024 1134 lactated ringers bolus 500 mL 500 mL Intravenous New Bag     07/22/2024 1127 iohexol (OMNIPAQUE) 350 MG/ML injection (MULTI-DOSE) 100 mL 100 mL Intravenous Given     07/22/2024 1342 ceFAZolin (ANCEF) IVPB (premix in dextrose) 2,000 mg 50 mL 2,000 mg Intravenous New Bag     07/22/2024 1602 lidocaine 1% buffered 10 mL Infiltration Given     07/22/2024 1630 HYDROmorphone (DILAUDID) injection 0.5 mg 0.5 mg Intravenous Given            History reviewed. No pertinent past medical history.  Present on Admission:  **None**      Admitting Diagnosis: Abdominal wall abscess [L02.211]  Flank pain [R10.9]  Age/Sex: 73 y.o. female  Admission Orders:  Scheduled Medications:  bisoprolol, 2.5 mg, Oral, Daily  cefazolin, 2,000 mg, Intravenous, Q8H  heparin (porcine), 5,000 Units, Subcutaneous, Q8H TASHI      Continuous IV Infusions:     PRN Meds:  acetaminophen, 975 mg, Oral, Q6H PRN  labetalol, 10 mg, Intravenous, Q4H PRN  ondansetron, 4 mg, Intravenous, Q4H PRN  oxyCODONE, 5 mg, Oral, Q4H PRN  oxyCODONE, 2.5 mg, Oral, Q4H PRN        INPATIENT CONSULT TO IR  IP CONSULT TO INFECTIOUS DISEASES    Network Utilization Review Department  ATTENTION: Please call with any questions or concerns to [phone] and carefully listen to the prompts so that you are directed to the right person. All voicemails are confidential.   For Discharge needs, contact Care Management DC Support Team at [phone] opt. 2  Send all requests for admission clinical reviews, approved or denied determinations and any other requests to dedicated fax number below belonging to the campus where the patient is receiving treatment. List of dedicated fax numbers for the Facilities:  FACILITY NAME UR FAX NUMBER   ADMISSION DENIALS (Administrative/Medical Necessity) [phone]   DISCHARGE SUPPORT TEAM (NETWORK) [phone]   PARENT CHILD  [facility] (Maternity/NICU/Pediatrics) [phone]   [facility] [phone]   [facility] [phone]   [facility] [phone]   [facility] [phone]   [facility] [phone]   [facility] [phone]   [facility] [phone]   [facility] [phone]   [facility] [phone]   [facility] [phone]   [facility] [phone]   [facility] [phone]

## 2024-07-23 NOTE — PROGRESS NOTES
"General Surgery  Progress Note   Nae Szymanski 73 y.o. female MRN: [id number]  Unit/Bed#: S -01 Encounter: [id number]    Assessment:  73F with multiple R lateral abdominal wall abscesses s/p IR drain placement .      Plan:  -Regular diet  -F/u IR drain cx, speciation  -Continue IV abx, transition to PO abx pending cultures   -ID consultation  -Routine IR drain care/flushes   -Multimodal analgesia - scheduled tylenol, robaxin and gabapentin  -DVT ppx  -OOB/ ambulation  -Incentive Spirometry  -Please message the General surgery resident role with any concerns      Subjective/Objective     Subjective:   Patient seen and examined at bedside, in no acute distress. No acute events overnight.  Patient reports that she had good sleep.  Patient denies pain overnight.  Patient had some pain with movement this morning.  Patient was a little sweaty but that is usual.  Patient is ambulating to the bathroom.  Patient tolerated diet.  No nausea vomiting fevers chest pain or shortness of breath.    Objective:   Vitals:Blood pressure 98/61, pulse 60, temperature 97.7 °F (36.5 °C), resp. rate 18, height 5' 4\" (1.626 m), weight 93.9 kg (207 lb), SpO2 (!) 89%.  Temp (24hrs), Av.8 °F (36.6 °C), Min:97.5 °F (36.4 °C), Max:98 °F (36.7 °C)      I/O          0701   0700  0701   0700  0701   0700    I.V. (mL/kg)  10 (0.1)     IV Piggyback  500     Total Intake(mL/kg)  510 (5.4)     Drains  40     Total Output  40     Net  +470                    Invasive Devices       Peripheral Intravenous Line  Duration             Peripheral IV 24 Left Antecubital 1 day              Drain  Duration             Abscess Drain RUQ 1 day                    Physical Exam:  GEN: NAD  HEENT: MMM  CV: well perfused RR  Lung: Normal effort  Ab: Soft, tender near IR drain, nondistended  Extrem: No CCE   Neuro: A+Ox3     Lab Results   Component Value Date    WBC 8.68 2024    HGB 13.3 2024    HCT " 40.2 07/24/2024    MCV 91 07/24/2024     (H) 07/24/2024      Lab Results   Component Value Date     08/11/2015    SODIUM 139 07/24/2024    K 3.1 (L) 07/24/2024     07/24/2024    CO2 31 07/24/2024    ANIONGAP 6 08/11/2015    AGAP 8 07/24/2024    BUN 16 07/24/2024    CREATININE 0.74 07/24/2024    GLUC 90 07/24/2024    GLUF 101 (H) 04/22/2019    CALCIUM 9.1 07/24/2024    AST 14 07/24/2024    ALT 9 07/24/2024    ALKPHOS 70 07/24/2024    PROT 7.1 08/11/2015    TP 6.2 (L) 07/24/2024    BILITOT 0.63 08/11/2015    TBILI 0.47 07/24/2024    EGFR 80 07/24/2024       VTE Prophylaxis: Heparin      Mahogany Coulter MD  7/24/2024  6:28 AM

## 2024-07-24 ENCOUNTER — TELEPHONE (OUTPATIENT)
Age: 74
End: 2024-07-24

## 2024-07-24 VITALS
WEIGHT: 207 LBS | SYSTOLIC BLOOD PRESSURE: 121 MMHG | HEIGHT: 64 IN | HEART RATE: 68 BPM | OXYGEN SATURATION: 92 % | TEMPERATURE: 97.8 F | DIASTOLIC BLOOD PRESSURE: 83 MMHG | BODY MASS INDEX: 35.34 KG/M2 | RESPIRATION RATE: 17 BRPM

## 2024-07-24 PROBLEM — M60.08 RECTUS SHEATH ABSCESS: Status: ACTIVE | Noted: 2024-07-24

## 2024-07-24 LAB
ALBUMIN SERPL BCG-MCNC: 3.5 G/DL (ref 3.5–5)
ALP SERPL-CCNC: 70 U/L (ref 34–104)
ALT SERPL W P-5'-P-CCNC: 9 U/L (ref 7–52)
ANION GAP SERPL CALCULATED.3IONS-SCNC: 8 MMOL/L (ref 4–13)
AST SERPL W P-5'-P-CCNC: 14 U/L (ref 13–39)
BASOPHILS # BLD AUTO: 0.03 THOUSANDS/ÂΜL (ref 0–0.1)
BASOPHILS NFR BLD AUTO: 0 % (ref 0–1)
BILIRUB SERPL-MCNC: 0.47 MG/DL (ref 0.2–1)
BUN SERPL-MCNC: 16 MG/DL (ref 5–25)
CALCIUM SERPL-MCNC: 9.1 MG/DL (ref 8.4–10.2)
CHLORIDE SERPL-SCNC: 100 MMOL/L (ref 96–108)
CO2 SERPL-SCNC: 31 MMOL/L (ref 21–32)
CREAT SERPL-MCNC: 0.74 MG/DL (ref 0.6–1.3)
EOSINOPHIL # BLD AUTO: 0.21 THOUSAND/ÂΜL (ref 0–0.61)
EOSINOPHIL NFR BLD AUTO: 2 % (ref 0–6)
ERYTHROCYTE [DISTWIDTH] IN BLOOD BY AUTOMATED COUNT: 12.5 % (ref 11.6–15.1)
GFR SERPL CREATININE-BSD FRML MDRD: 80 ML/MIN/1.73SQ M
GLUCOSE SERPL-MCNC: 90 MG/DL (ref 65–140)
HCT VFR BLD AUTO: 40.2 % (ref 34.8–46.1)
HGB BLD-MCNC: 13.3 G/DL (ref 11.5–15.4)
IMM GRANULOCYTES # BLD AUTO: 0.08 THOUSAND/UL (ref 0–0.2)
IMM GRANULOCYTES NFR BLD AUTO: 1 % (ref 0–2)
LYMPHOCYTES # BLD AUTO: 1.91 THOUSANDS/ÂΜL (ref 0.6–4.47)
LYMPHOCYTES NFR BLD AUTO: 22 % (ref 14–44)
MCH RBC QN AUTO: 30.2 PG (ref 26.8–34.3)
MCHC RBC AUTO-ENTMCNC: 33.1 G/DL (ref 31.4–37.4)
MCV RBC AUTO: 91 FL (ref 82–98)
MONOCYTES # BLD AUTO: 0.72 THOUSAND/ÂΜL (ref 0.17–1.22)
MONOCYTES NFR BLD AUTO: 8 % (ref 4–12)
NEUTROPHILS # BLD AUTO: 5.73 THOUSANDS/ÂΜL (ref 1.85–7.62)
NEUTS SEG NFR BLD AUTO: 67 % (ref 43–75)
NRBC BLD AUTO-RTO: 0 /100 WBCS
PLATELET # BLD AUTO: 435 THOUSANDS/UL (ref 149–390)
PMV BLD AUTO: 9.5 FL (ref 8.9–12.7)
POTASSIUM SERPL-SCNC: 3.1 MMOL/L (ref 3.5–5.3)
PROT SERPL-MCNC: 6.2 G/DL (ref 6.4–8.4)
RBC # BLD AUTO: 4.4 MILLION/UL (ref 3.81–5.12)
SODIUM SERPL-SCNC: 139 MMOL/L (ref 135–147)
WBC # BLD AUTO: 8.68 THOUSAND/UL (ref 4.31–10.16)

## 2024-07-24 PROCEDURE — 80053 COMPREHEN METABOLIC PANEL: CPT | Performed by: SURGERY

## 2024-07-24 PROCEDURE — 97166 OT EVAL MOD COMPLEX 45 MIN: CPT

## 2024-07-24 PROCEDURE — NC001 PR NO CHARGE: Performed by: SURGERY

## 2024-07-24 PROCEDURE — 99238 HOSP IP/OBS DSCHRG MGMT 30/<: CPT | Performed by: SURGERY

## 2024-07-24 PROCEDURE — 85025 COMPLETE CBC W/AUTO DIFF WBC: CPT

## 2024-07-24 PROCEDURE — 99233 SBSQ HOSP IP/OBS HIGH 50: CPT | Performed by: INTERNAL MEDICINE

## 2024-07-24 PROCEDURE — 97163 PT EVAL HIGH COMPLEX 45 MIN: CPT

## 2024-07-24 RX ORDER — ACETAMINOPHEN 500 MG
500 TABLET ORAL EVERY 6 HOURS PRN
Qty: 30 TABLET | Refills: 0 | Status: SHIPPED | OUTPATIENT
Start: 2024-07-24 | End: 2024-07-24

## 2024-07-24 RX ORDER — IBUPROFEN 400 MG/1
400 TABLET ORAL EVERY 6 HOURS PRN
Qty: 28 TABLET | Refills: 0 | Status: SHIPPED | OUTPATIENT
Start: 2024-07-24 | End: 2024-07-31

## 2024-07-24 RX ORDER — CEFADROXIL 500 MG/1
500 CAPSULE ORAL EVERY 12 HOURS SCHEDULED
Qty: 14 CAPSULE | Refills: 0 | Status: SHIPPED | OUTPATIENT
Start: 2024-07-24 | End: 2024-07-24

## 2024-07-24 RX ORDER — CEFADROXIL 500 MG/1
500 CAPSULE ORAL EVERY 12 HOURS SCHEDULED
Qty: 14 CAPSULE | Refills: 0 | Status: SHIPPED | OUTPATIENT
Start: 2024-07-24 | End: 2024-07-31

## 2024-07-24 RX ORDER — POTASSIUM CHLORIDE 20 MEQ/1
40 TABLET, EXTENDED RELEASE ORAL 2 TIMES DAILY
Status: DISCONTINUED | OUTPATIENT
Start: 2024-07-24 | End: 2024-07-24 | Stop reason: HOSPADM

## 2024-07-24 RX ORDER — SODIUM CHLORIDE 9 MG/ML
10 INJECTION, SOLUTION INTRAMUSCULAR; INTRAVENOUS; SUBCUTANEOUS DAILY
Qty: 300 ML | Refills: 0 | Status: SHIPPED | OUTPATIENT
Start: 2024-07-24 | End: 2024-08-23

## 2024-07-24 RX ORDER — ACETAMINOPHEN 500 MG
500 TABLET ORAL EVERY 6 HOURS PRN
Qty: 30 TABLET | Refills: 0 | Status: SHIPPED | OUTPATIENT
Start: 2024-07-24

## 2024-07-24 RX ADMIN — ACETAMINOPHEN 975 MG: 325 TABLET, FILM COATED ORAL at 02:01

## 2024-07-24 RX ADMIN — POTASSIUM CHLORIDE 40 MEQ: 1500 TABLET, EXTENDED RELEASE ORAL at 08:57

## 2024-07-24 RX ADMIN — IBUPROFEN 400 MG: 400 TABLET, FILM COATED ORAL at 06:16

## 2024-07-24 RX ADMIN — ACETAMINOPHEN 975 MG: 325 TABLET, FILM COATED ORAL at 06:16

## 2024-07-24 RX ADMIN — BISOPROLOL FUMARATE 2.5 MG: 5 TABLET ORAL at 08:54

## 2024-07-24 RX ADMIN — HEPARIN SODIUM 5000 UNITS: 5000 INJECTION INTRAVENOUS; SUBCUTANEOUS at 06:16

## 2024-07-24 RX ADMIN — CEFAZOLIN SODIUM 2000 MG: 2 SOLUTION INTRAVENOUS at 06:08

## 2024-07-24 RX ADMIN — ACETAMINOPHEN 975 MG: 325 TABLET, FILM COATED ORAL at 13:05

## 2024-07-24 NOTE — PHYSICAL THERAPY NOTE
PHYSICAL THERAPY EVALUATION  DATE: 07/24/24  TIME: 6142-4050    NAME:  Nae Szymanski  AGE:   73 y.o.  Mrn:   [id number]  Length Of Stay: 1    ADMIT DX:  Abdominal wall abscess [L02.211]  Flank pain [R10.9]    History reviewed. No pertinent past medical history.  Past Surgical History:   Procedure Laterality Date    IR DRAINAGE TUBE PLACEMENT  7/22/2024       Performed at least 2 patient identifiers during session: Name, Birthday, ID bracelet, and Epic photo     07/24/24 1006   PT Last Visit   PT Visit Date 07/24/24   Note Type   Note type Evaluation   Pain Assessment   Pain Assessment Tool 0-10   Pain Score No Pain   Restrictions/Precautions   Weight Bearing Precautions Per Order No   Other Precautions Pain;Multiple lines  (R lateral JOSEPH drain, [place])   Home Living   Type of Home House   Home Layout One level;Stairs to enter without rails  (2+1 PAZ with no HR, FFSU (1 single step between certain areas of the home))   Bathroom Shower/Tub Tub/shower unit   Bathroom Toilet Raised   Bathroom Equipment Other (Comment)  (none)   Bathroom Accessibility Accessible   Home Equipment Crutches   Prior Function   Level of [place] Independent with ADLs;Independent with functional mobility;Independent with IADLS   Lives With Spouse   Receives Help From Family   IADLs Independent with driving;Independent with meal prep;Independent with medication management   Falls in the last 6 months 0   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 At baseline is fully independent with all aspects of self care and functional mobility of household and community distances with no AD. Very supportive spouse whom is home/retired and able to physically assist prn.   General   Additional Pertinent History Pt is a 73 yr old female admitted under observation 7/22/24 with RUQ abdominal pain x6days. IR drain placed for drainage of R lateral  "abdominal wall abscess 7/22/24.   Family/Caregiver Present Yes  (Spouse present t/o session)   Cognition   Overall Cognitive Status WFL   Arousal/Participation Alert   Orientation Level Oriented X4   Memory Within functional limits   Following Commands Follows all commands and directions without difficulty   Subjective   Subjective \"I felt lightheaded earlier and that kind of scared me, but I'm feeling better now.\"   RUE Assessment   RUE Assessment WFL   LUE Assessment   LUE Assessment WFL   RLE Assessment   RLE Assessment WFL   LLE Assessment   LLE Assessment WFL   Vision-Basic Assessment   Current Vision Wears glasses all the time   Coordination   Movements are Fluid and Coordinated 1   Sensation WFL   Light Touch   RLE Light Touch Grossly intact   LLE Light Touch Grossly intact   Proprioception   RLE Proprioception Grossly intact   LLE Proprioception Grossly Intact   Self Selected Walking Speed   SSWS Trial 1 (Seconds) 7.51 Seconds   SSWS Trial 2 (Seconds) 7.49 Seconds   SSWS Trial 3 (Seconds) 8.11 Seconds   SSWS Average Time (Seconds) 7.7 seconds   SSWS Average Score (m/sec) 0.7 m/sec   Bed Mobility   Supine to Sit 6  Modified independent   Additional items HOB elevated;Increased time required   Sit to Supine   (NT as pt was left seated OOB in recliner chair at end of session)   Additional Comments Pt denies lightheadedness or dizziness with change in positioning. BP monitored and stable with all changes in positioning.   Transfers   Sit to Stand 6  Modified independent   Additional items Increased time required  (no AD)   Stand to Sit 6  Modified independent   Additional items Armrests;Increased time required  (no AD)   Stand pivot 5  Supervision   Additional items Assist x 1;Increased time required;Verbal cues  (no AD)   Additional Comments Pt safe and steady upon standing, no evidence of instability.   Ambulation/Elevation   Gait pattern Decreased foot clearance;Short stride;Decreased heel strike;Decreased " "hip extension  (slowed for age as compared to her baseline, however pt cautious d/t previous onset of lightheadedness)   Gait Assistance 5  Supervision   Additional items Assist x 1;Verbal cues   Assistive Device None   Distance 300ft x1   Stair Management Assistance Not tested  (pt denies concerns about completion of elevations upon return to home)   Balance   Static Sitting Good   Dynamic Sitting Fair +   Static Standing Fair +   Dynamic Standing Fair +   Ambulatory Fair +   Endurance Deficit   Endurance Deficit No   Activity Tolerance   Activity Tolerance Patient tolerated treatment well   Medical Staff Made Aware Spoke with CM, OT, RN   Assessment   Prognosis Good   Assessment Pt seen for PT evaluation for mobility assessment & discharge needs. Activity orders: Ambulate patient. Pt admitted 7/22/2024 w/ RUQ abdominal pain x6 days, dx Rectus sheath abscess. During PT IE, pt completes bed mobility and transfers at JYOTI level, ambulates 300ft with no AD and SBA. Pt displays a gait speed of 0.7m/s, qualifying pt as \"a limited community ambulator\" however is cautious d/t previous onset of lightheadedness. Pt displays above outlined functional impairments & limitations, and presents below her baseline level of functional mobility however is anticipated to return to PLOF with medical optimization and continued mobility efforts. The AM-PAC & Barthel Index outcome tools were used to assist in determining pt safety w/ mobility/self care & appropriate d/c recommendations, see above for scores. Pt is at risk of falls d/t varying levels of pain , acuity of medical illness, and polypharmacy. Pt's clinical presentation is currently unstable/unpredictable as seen in pt's presentation of vital sign response and changing level of pain. Based on pt presentation & impairments, pt would most appropriately benefit from return to home with family support upon d/c, no skilled PT services indicated.  Recommend frequent bouts of " supervised ambulation in room and hallways during remainder of her stay in order to combat hospital related deconditioning.   Goals   Patient Goals to stay pain free; to return to independent PLOF   PT Treatment Day 0   Discharge Recommendation   Rehab Resource Intensity Level, PT No post-acute rehabilitation needs   AM-PAC Basic Mobility Inpatient   Turning in Flat Bed Without Bedrails 4   Lying on Back to Sitting on Edge of Flat Bed Without Bedrails 4   Moving Bed to Chair 3   Standing Up From Chair Using Arms 4   Walk in Room 3   Climb 3-5 Stairs With Railing 3   Basic Mobility Inpatient Raw Score 21   Basic Mobility Standardized Score 45.55   [facility] Highest Level Of Mobility   -HL Goal 6: Walk 10 steps or more   JH-HLM Achieved 8: Walk 250 feet ot more   Modified [place]ina Scale   Modified Petroleum Scale 1   Barthel Index   Feeding 10   Bathing 5   Grooming Score 5   Dressing Score 5   Bladder Score 10   Bowels Score 10   Toilet Use Score 10   Transfers (Bed/Chair) Score 15   Mobility (Level Surface) Score 10   Stairs Score 5   Barthel Index Score 85   End of Consult   Patient Position at End of Consult Bedside chair;All needs within reach  (spouse present t/o session)       Based on patient's [facility] Highest Level of Mobility scores today, patient currently has a goal of -[facility] Levels: 8: WALK 250 FEET OR MORE, to be completed with RN staffing each shift, in order to improve overall activity tolerance and mobility, combat hospital related deconditioning, and maximize outcomes for d/c from the acute care setting.     The patient's AM-PAC Basic Mobility Inpatient Short Form Raw Score is 21. A Raw score of greater than 16 suggests the patient may benefit from discharge to home. Please also refer to the recommendation of the Physical Therapist for safe discharge planning.      Radha Oseguera PT, DPT   Available via [employer]  NPI # [id number]  [place] License - [id number]  7/24/2024

## 2024-07-24 NOTE — DISCHARGE INSTR - AVS FIRST PAGE
Discharge instructions    General: You will feel pulling sensations around the wound and/or aches and pains around the incisions. This is normal. Even minor surgery is a change in your body and this is your body’s way of reaction to it. If you have had abdominal surgery, it may help to support the incision with a small pillow or blanket for comfort when moving or coughing.    Wound care:    Bandage/Dressing - Make sure to remove the bandage in about 48 hours, unless instructed otherwise. You usually don't have to redress the wound after 24-48 hours, unless for comfort. Keep the incision clean and dry. Let air get to it. If the Steri-Strips fall off, just keep the wound clean.     Glue - Leave glue alone, it will fall off on its own, no need for an additional dressings    Water/Shower: You may shower over the wound, unless there are drain tubes left in place. Do not bathe or use a pool or hot tub until cleared by the physician. You may shower right over the staples, glue or Steri-Strips and rinse wound with soapy water but do not scrub incision pat dry when you are done.    Activity: You may go up and down stairs, walk as much as you are comfortable, but walk at least 3 times each day. If you have had abdominal or hernia surgery, do not lift anything heavier than 15 pounds for at least 4 weeks. Listen to your body, and let this guide recovery. Activity with gradual increase over time and listening to your body is very good for healing and prevention of scar tissue. Some discomfort with activity is ok to work through and to be expected, but sharp or severe pain is a sign from your body to back down to a lower intensity and simply try again later or the next day.    Diet: You may resume a regular diet. If you had a same-day surgery or overnight stay surgery, you may wish to eat lightly for a few days: soups, crackers, and sandwiches. You may resume a regular diet when ready.    Medications: Resume all of your previous  medications, unless told otherwise by the doctor. Tylenol and ibuoprofen is always fine, unless you are taking any narcotic pain medication containing Tylenol (such as Percocet, Darvocet, Vicodin, or anything containing acetaminophen). Do not take Tylenol if you're taking these medications. You do not need to take the narcotic pain medications unless you are having significant pain and discomfort.    Driving: He will need someone to drive you home on the day of surgery. Do not drive or make any important decisions while on narcotic pain medication or 24 hours and after anesthesia or sedation for surgery. Generally, you may drive when your off all narcotic pain medications, and you can turn in your seat comfortably to check your blind spot.     Upset Stomach: You may take Maalox, Tums, or similar items for an upset stomach. If your narcotic pain medication causes an upset stomach, do not take it on an empty stomach. Try taking it with at least some crackers or toast.     Constipation: Patients often experienced constipation after surgery. You may take over-the-counter medication for this, such as Metamucil, Senokot, Dulcolax, milk of magnesia, etc. You may take a suppository unless you have had anorectal surgery such as a procedure on your hemorrhoids. If you experience significant nausea or vomiting after abdominal surgery, call the office before trying any of these medications.    Call the office: If you are experiencing any of the following, fevers above 101.5°, significant nausea or vomiting, if the wound develops drainage and/or is excessive redness around the wound, or if you have significant diarrhea or other worsening symptoms.    Pain: You may be given a prescription for pain. This will be given to the hospital, the day of surgery.    Sexual Activity: You may resume sexual activity when you feel ready and comfortable and your incision is sealed and healed without apparent infection risk.    IR Flushing and  Care Instructions    Drainage Tube Home Care - (Does not include  Asept/Tenkhoff/PD catheters and G/GJ tubes)   IR Tube #1 Instructions: May shower after 48 hours.  Keep tube clean and dry with soap and water.  Call IR if output less than 10cc/day for 2 consecutive days.  Call IR if unable to flush tube, patient reports increased pain or pressure when flushing, drainage around tube insertion site when flushing, leakage around tube insertion site with flushing  Call IR if patient has fever of >101, anika bloody drainage, broken sutures, displaced tube, pain and redness at insertion site and all questions regarding tube.  Record daily drainage tube output after discharge  May remove dressing after 48 hours if preferred.

## 2024-07-24 NOTE — OCCUPATIONAL THERAPY NOTE
Occupational Therapy Evaluation     Patient Name: Nae Szymanski  Today's Date: 7/24/2024  Problem List  Principal Problem:    Rectus sheath abscess    Past Medical History  History reviewed. No pertinent past medical history.  Past Surgical History  Past Surgical History:   Procedure Laterality Date    IR DRAINAGE TUBE PLACEMENT  7/22/2024 07/24/24 1007   OT Last Visit   OT Visit Date 07/24/24   Note Type   Note type Evaluation   Pain Assessment   Pain Assessment Tool 0-10   Pain Score No Pain   Restrictions/Precautions   Weight Bearing Precautions Per Order No   Other Precautions Pain;Multiple lines  (R lateral J drain, [place])   Home Living   Type of Home House   Home Layout One level;Stairs to enter with rails  (2+1 c no HR. FFSU)   Bathroom Shower/Tub Tub/shower unit   Bathroom Toilet Raised   Bathroom Equipment   (none)   Bathroom Accessibility Accessible   Home Equipment Crutches   Additional Comments no AD used at [facility]   Prior Function   Level of [address] Independent with ADLs;Independent with functional mobility;Independent with IADLS   Lives With Spouse   Receives Help From Family  (supportive spouse who is home and able to assist as needed)   IADLs Independent with driving;Independent with meal prep;Independent with medication management   Falls in the last 6 months 0   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 lives c spouse. works full time. 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 s/p IR for drain placement.   Additional General Comments pt reports this AM after ambulating to the bathroom she experiences lightheaded/dizziness, had soft BPs   Subjective   Subjective Pt reports feeling much better after mobilizing  with therapy, states no further concerns re: D/C home   ADL   Where Assessed Chair  (vs EOB)   Eating Assistance 7  Independent   Grooming Assistance 7  Independent   UB Bathing Assistance 6  Modified Independent   LB Bathing Assistance 5  Supervision/Setup   UB Dressing Assistance 6  Modified independent   LB Dressing Assistance 5  Supervision/Setup   Toileting Assistance  5  Supervision/Setup   Toileting Deficit   (declines needing to void, reports completed this AM without assistanc in bathroom)   Functional Assistance 5  Supervision/Setup   Bed Mobility   Supine to Sit 6  Modified independent   Additional items HOB elevated;Increased time required   Sit to Supine   (seated OOB in recliner at end of session)   Additional Comments denies lightheaded/dizziness c positional changes BP monitored and stable, no significant changes   Transfers   Sit to Stand 6  Modified independent   Additional items Increased time required  (no AD)   Stand to Sit 6  Modified independent   Additional items Increased time required  (no AD)   Stand pivot 5  Supervision   Additional items Assist x 1;Increased time required;Verbal cues   Additional Comments pt demonstrates safe body mechanics during all transfers , no AD used   Functional Mobility   Functional Mobility 5  Supervision  (distant supervision)   Additional Comments community distance, no ligtheaded/dizziness.   Additional items   (no AD)   Balance   Static Sitting Good   Dynamic Sitting Fair +   Static Standing Fair +   Dynamic Standing Fair +   Activity Tolerance   Activity Tolerance Patient tolerated treatment well   Medical Staff Made Aware ANGELIQUE Spangler, PT, RN   RUE Assessment   RUE Assessment WFL  (grossly 4/5 based on functional assessment)   LUE Assessment   LUE Assessment WFL   Hand Function   Gross Motor Coordination Functional   Fine Motor Coordination Functional   Sensation   Light Touch No apparent deficits   Vision-Basic Assessment   Current Vision Wears glasses all  the time   Cognition   Overall Cognitive Status WFL   Arousal/Participation Alert;Cooperative   Attention Within functional limits   Orientation Level Oriented X4   Memory Within functional limits   Following Commands Follows all commands and directions without difficulty   Comments Pt agreeable to OT session, eager to work with therapy. Appropriate safety awareness demonstrated   Assessment   Limitation Decreased endurance  (higher level dynamic balance)   Prognosis Good   Assessment Patient is a 73 y.o. female seen for OT evaluation at [facility] following admission on 7/22/2024  s/p Rectus sheath abscess. Please see above for comprehensive list of comorbidities and significant PMHx impacting functional performance.  At baseline, pt is (I) c ADL/IADLs, no AD. Upon initial evaluation, pt appears to be performing at / near functional status. Pt presents with the following deficits: decreased balance  and endurance. However, no significant impact in occupational performance. Personal/Environmental factors impacting D/C include: steps to enter/navigate the home. Supporting factors include: support system available and attitude towards recovery . No OT services warranted at this time. Recommending D/C to return to previous environment with social support once medically cleared. Will sign off, D/C OT. Please reconsult if further immediate skilled OT needs warranted.   Goals   Patient Goals to return to PLOF   Plan   Treatment Interventions   (no IP OT needs indicated)   OT Frequency Eval only  (D/C IP OT)   Discharge Recommendation   Rehab Resource Intensity Level, OT No post-acute rehabilitation needs   Additional Comments  The patient's raw score on the AM-PAC Daily Activity Inpatient Short Form is 21. A raw score of less than 19 suggests the patient may benefit from discharge to post-acute rehabilitation services. Please refer to the recommendation of the Occupational Therapist for safe discharge  planning.   AM-PAC Daily Activity Inpatient   Lower Body Dressing 3   Bathing 3   Toileting 3   Upper Body Dressing 4   Grooming 4   Eating 4   Daily Activity Raw Score 21   Daily Activity Standardized Score (Calc for Raw Score >=11) 44.27   AM-PAC Applied Cognition Inpatient   Following a Speech/Presentation 4   Understanding Ordinary Conversation 4   Taking Medications 4   Remembering Where Things Are Placed or Put Away 4   Remembering List of 4-5 Errands 4   Taking Care of Complicated Tasks 4   Applied Cognition Raw Score 24   Applied Cognition Standardized Score 62.21   End of Consult   Education Provided Yes;Family or social support of family present for education by provider  (Spouse)   Patient Position at End of Consult Bedside chair  (RN aware, verbalizes OK for no chair alarm)   Nurse Communication Nurse aware of consult  ([place])   Shalonda Varma, OT

## 2024-07-24 NOTE — PROGRESS NOTES
Progress Note - Infectious Disease   Nae Szymanski 73 y.o. female MRN: [id number]  Unit/Bed#: S -01 Encounter: [id number]      Impression/Plan:  Abdominal wall abscesses. Unclear etiology. P/w RLQ abdominal pain. CT demonstrated multiple rim-enhancing collections involving the right lateral abdominal wall musculature suspicious for abscesses. Patient is s/p IR drain placement 7/23. Cultures currently NGTD. Seems like the most likely etiology would be trauma to the site, however, patient denies injections into the abdomen or history of trauma. So far blood cultures are NGTD. Culture data itself should provide some insight into whether this was a GI etiology vs cutaneous etiology. It seems less likely that this could be related to her previous diverticulitis episode in May vs undiagnosed colonic malignancy given the location of the abscesses within the musculature and lack of bacteremia. However, would still ensure patient is up to date with her colonoscopy screenings. Patient remains afebrile and HDS on cefazolin. Cefazolin should provide coverage of MSSA, strep, and sensitive enteric gram negatives. IR drain should be providing source control. Will continue on cefazolin while patient remains admitted. Given improvement on cefazolin and otherwise negative culture data, plan to transition to PO cefadroxil at discharge.   Continue IV cefazolin while admitted  Transition to PO Cefadroxil 500mg q12h to complete a total 7d antibiotic course from IR drain placement (through 7/28)  Follow-up IR culture data  If patient is discharged prior to IR cultures finalizing, will follow in the outpatient setting and call if antibiotic changes need to be made.   Frequent drain checks/drain management  Serial abdominal examinations  Appreciate general surgery recommendations  Continue to follow CBCD and BMP with AM labs to monitor for potential antimicrobial toxicities and assess for clinical response to antibiotics.       Recent history of diverticulitis. Seen in the ED in May and diagnosed with diverticulitis. Treated with PO Augmentin and reported improvement. No current LLQ pain or acute diverticulitis seen on imaging studies.     Above plan was discussed in detail with patient at bedside.   Above plan was discussed in detail with primary service resident, who agrees with the plan to transition to PO cefadroxil at discharge.    OK for discharge from ID standpoint.     Antibiotics:  IV cefazolin    Subjective:  Patient states she is feeling well. Had some dizziness this morning when she went to stand up. Attributes this to deconditioning and remaining in bed for the last couple days. Has not had any fevers, chills, N/V/D, CP, SOB. Ongoing light pain in her RLQ at the drain site. Otherwise no complaints. No rashes. No new symptoms.    Objective:  Vitals:  Temp:  [97.5 °F (36.4 °C)-97.9 °F (36.6 °C)] 97.9 °F (36.6 °C)  HR:  [54-75] 75  Resp:  [17-18] 18  BP: ()/(58-72) 132/71  SpO2:  [89 %-94 %] 93 %  Temp (24hrs), Av.7 °F (36.5 °C), Min:97.5 °F (36.4 °C), Max:97.9 °F (36.6 °C)  Current: Temperature: 97.9 °F (36.6 °C)    Physical Exam:   General Appearance:  Alert, interactive, nontoxic, no acute distress.   Throat: Oropharynx moist without lesions.    Lungs:   Clear to auscultation bilaterally; no wheezes, rhonchi or rales; respirations unlabored.   Heart:  RRR; no murmur, rub or gallop.   Abdomen:   Soft, non-distended, positive bowel sounds. RLQ drain in place. No leaking around drain. Tenderness with palpation at drain site.    Extremities: No clubbing or cyanosis, no edema.   Skin: No new rashes, lesions, or draining wounds noted on exposed skin.     Labs, Imaging, & Other studies:   All pertinent labs and imaging studies were personally reviewed  Results from last 7 days   Lab Units 24  0514 24  0858 24  1013   WBC Thousand/uL 8.68 9.42 9.63   HEMOGLOBIN g/dL 13.3 13.1 13.9   PLATELETS Thousands/uL  435* 442* 432*     Results from last 7 days   Lab Units 07/24/24  0514 07/22/24  1013   POTASSIUM mmol/L 3.1* 3.2*   CHLORIDE mmol/L 100 98   CO2 mmol/L 31 29   BUN mg/dL 16 16   CREATININE mg/dL 0.74 0.71   EGFR ml/min/1.73sq m 80 84   CALCIUM mg/dL 9.1 9.3   AST U/L 14 15   ALT U/L 9 17   ALK PHOS U/L 70 87     Results from last 7 days   Lab Units 07/22/24  1615 07/22/24  1329   BLOOD CULTURE   --  No Growth at 24 hrs.  No Growth at 24 hrs.   GRAM STAIN RESULT  3+ Polys  No bacteria seen  --    BODY FLUID CULTURE, STERILE  No growth  --    MRSA CULTURE ONLY   --  No Methicillin Resistant Staphlyococcus aureus (MRSA) isolated                         Imaging Studies:  I have personally reviewed pertinent imaging study reports and images in PACS.       Lorri East PA-C  [facility]

## 2024-07-24 NOTE — TELEPHONE ENCOUNTER
Resident called to schedule the patient at the med surg clinic for Monday. Patient is being discharged today. Per ned DAVIS, transferred the patient to trauma .

## 2024-07-25 LAB
BACTERIA SPEC ANAEROBE CULT: NO GROWTH
BACTERIA SPEC BFLD CULT: NO GROWTH
GRAM STN SPEC: NORMAL
GRAM STN SPEC: NORMAL

## 2024-07-25 NOTE — DISCHARGE SUMMARY
Discharge Summary - General Surgery  Nae Szymanski 73 y.o. female MRN: [id number]  Unit/Bed#: S -01 Encounter: [id number]    Admission Date: 7/22/2024     Discharge Date: 7/24/2024     Admitting Diagnosis: Abdominal wall abscess [L02.211]  Flank pain [R10.9]    Discharge Diagnosis: Principal Problem:    Rectus sheath abscess  Resolved Problems:    * No resolved hospital problems. *      Attending and Service:   Aleisha Garcia*;     Procedures Performed:   IR drain in [facility] Course:   Nae Szymanski 73 y.o. female with hx of HTN, found to have a abdominal wall abscess on 7/22.  She was treated with IV Ancef and with an IR 10 [place] pigtail drain.    Patient was discharged on HD#2. On the day of discharge, the patient was voiding spontaneously, ambulating at baseline, and pain was well controlled. She understood all instructions for discharge. She was also given the names and numbers of the providers as well as instructions for follow up appointments.  Her [facility], for general and blood culture follow-up.  She also has a follow-up with IR for drain check in 1 week.    Condition at Discharge: good     Discharge instructions/Information to patient and family:   See after visit summary for information provided to patient and family.      Provisions for Follow-Up Care:  See after visit summary for information related to follow-up care and any pertinent home health orders.      Disposition: Home    Planned Readmission: No    Discharge Statement   I spent 30 minutes discharging the patient. This time was spent on the day of discharge. I had direct contact with the patient on the day of discharge. Additional documentation is required if more than 30 minutes were spent on discharge.     Discharge Medications:  See after visit summary for reconciled discharge medications provided to patient and family.      Isiah Marquez MD  7/24/2024  9:24  PM

## 2024-07-27 LAB
BACTERIA BLD CULT: NORMAL
BACTERIA BLD CULT: NORMAL

## 2024-07-29 ENCOUNTER — OFFICE VISIT (OUTPATIENT)
Dept: SURGERY | Facility: CLINIC | Age: 74
End: 2024-07-29
Payer: COMMERCIAL

## 2024-07-29 VITALS
SYSTOLIC BLOOD PRESSURE: 127 MMHG | BODY MASS INDEX: 35.01 KG/M2 | RESPIRATION RATE: 12 BRPM | WEIGHT: 205.1 LBS | OXYGEN SATURATION: 97 % | TEMPERATURE: 97.6 F | DIASTOLIC BLOOD PRESSURE: 82 MMHG | HEART RATE: 80 BPM | HEIGHT: 64 IN

## 2024-07-29 DIAGNOSIS — M60.08 RECTUS SHEATH ABSCESS: Primary | ICD-10-CM

## 2024-07-29 PROCEDURE — 99212 OFFICE O/P EST SF 10 MIN: CPT | Performed by: SURGERY

## 2024-07-29 NOTE — PROGRESS NOTES
Ambulatory Visit  Name: Nae Szymanski      : 1950      MRN: [id number]  Encounter Provider: General Surgery Generic Physician Fawn  Encounter Date: 2024   Encounter department: [facility] SURGERY [place]    Assessment & Plan   1. Rectus sheath abscess  Assessment & Plan:  Nae Szymanski is a 73 y.o. female presenting for follow-up for rectus sheath abscess s/p IR drain insertion .   Reports she has bee flushing daily and recording output which has been gradually reducing, most recently 10cc a day. Mild discomfort with flushing otherwise no pain. Tolerating diet. Denies fever, chills, nausea, vomiting, changes in bowel or bladder function.  Abdomen soft, non tender, non distended. IR drain in place RUQ with mostly serious output, some debris and light sanguineous tinge. No skin changes to the area. Dressing over drain site minimally soiled, changed in office.   Patient scheduled for drain check tomorrow, . Discussed possibility of drain removal if found to be function properly given the low output.   Discussed patient can follow-up as needed and to call if she develops fever, chills, pain or drainage from drain site.       History of Present Illness     Nae Szymanski is a 73 y.o. female who presents for follow-up for rectus sheath abscess s/p IR drain insertion . Patient reports she has been flushing daily and recording output which has been gradually reducing, most recently 10cc a day. Reports some mild discomfort with flushing otherwise no pain. She has been tolerating a diet without difficulty. Denies fever, chills, nausea, vomiting, changes in bowel or bladder function.    Review of Systems   Constitutional:  Negative for chills and fever.   Gastrointestinal:  Positive for abdominal distention, abdominal pain and nausea. Negative for vomiting.   Skin:  Negative for color change and rash.     Medical History Reviewed by provider this encounter:  Tobacco   "Allergies  Meds  Problems  Med Hx  Surg Hx  Fam Hx       Objective     /82   Pulse 80   Temp 97.6 °F (36.4 °C) (Temporal)   Resp 12   Ht 5' 4\" (1.626 m)   Wt 93 kg (205 lb 1.6 oz)   SpO2 97%   BMI 35.21 kg/m²     Physical Exam  Constitutional:       Appearance: Normal appearance.   Cardiovascular:      Rate and Rhythm: Normal rate.   Pulmonary:      Effort: Pulmonary effort is normal.   Abdominal:      General: Abdomen is flat. There is no distension.      Palpations: Abdomen is soft.      Tenderness: There is no abdominal tenderness.      Comments: RUQ IR drain in place with serous drainage, some debris and light sanguineous tinge to fluid.   Skin:     General: Skin is warm and dry.   Neurological:      General: No focal deficit present.      Mental Status: She is alert.   Psychiatric:         Mood and Affect: Mood normal.         Behavior: Behavior normal.       Administrative Statements   I have spent a total time of 20 minutes in caring for this patient on the day of the visit/encounter including  discussion of plan for the drain and precautions to call the office if she becomes febrile, increase in pain or skin changes at the site of drain .        "

## 2024-07-29 NOTE — ASSESSMENT & PLAN NOTE
Nae Szymanski is a 73 y.o. female presenting for follow-up for rectus sheath abscess s/p IR drain insertion 7/22.   Reports she has bee flushing daily and recording output which has been gradually reducing, most recently 10cc a day. Mild discomfort with flushing otherwise no pain. Tolerating diet. Denies fever, chills, nausea, vomiting, changes in bowel or bladder function.  Abdomen soft, non tender, non distended. IR drain in place RUQ with mostly serious output, some debris and light sanguineous tinge. No skin changes to the area. Dressing over drain site minimally soiled, changed in office.   Patient scheduled for drain check tomorrow, 7/30. Discussed possibility of drain removal if found to be function properly given the low output.   Discussed patient can follow-up as needed and to call if she develops fever, chills, pain or drainage from drain site.

## 2024-07-30 ENCOUNTER — HOSPITAL ENCOUNTER (OUTPATIENT)
Dept: NON INVASIVE DIAGNOSTICS | Facility: HOSPITAL | Age: 74
Discharge: HOME | End: 2024-07-30
Attending: RADIOLOGY
Payer: COMMERCIAL

## 2024-07-30 DIAGNOSIS — L02.211 ABDOMINAL WALL ABSCESS: ICD-10-CM

## 2024-07-30 PROCEDURE — NC001 PR NO CHARGE: Performed by: RADIOLOGY

## 2024-07-30 PROCEDURE — 76080 X-RAY EXAM OF FISTULA: CPT | Performed by: RADIOLOGY

## 2024-07-30 PROCEDURE — 49424 ASSESS CYST CONTRAST INJECT: CPT | Performed by: RADIOLOGY

## 2024-07-30 RX ADMIN — IOHEXOL 1 ML: 350 INJECTION, SOLUTION INTRAVENOUS at 12:07

## 2024-09-07 NOTE — CONSULTS
Consultation - Infectious Disease   Nae DEX Szymanski 73 y.o. female MRN: [id number]  Unit/Bed#: S -01 Encounter: [id number]    IMPRESSION & RECOMMENDATIONS:   Abdominal wall abscesses. Unclear etiology. P/w RLQ abdominal pain. CT demonstrated multiple rim-enhancing collections involving the right lateral abdominal wall musculature suspicious for abscesses. Patient is s/p IR drain placement 7/23. Cultures pending. Seems like the most likely etiology would be trauma to the site, however, patient denies injections into the abdomen or history of trauma. So far blood cultures are NGTD. Culture data itself should provide some insight into whether this was a GI etiology vs cutaneous etiology. It seems less likely that this could be related to her previous diverticulitis episode in May vs undiagnosed colonic malignancy given the location of the abscesses within the musculature and lack of bacteremia. However, would still ensure patient is up to date with her colonoscopy screenings. Patient remains afebrile and HDS on cefazolin. Cefazolin should provide coverage of MSSA, strep, and sensitive enteric gram negatives. IR drain should be providing source control. Will continue on cefazolin for now pending culture data.   Continue IV cefazolin for now  Follow-up IR culture data  Will adjust antibiotics as needed based on culture data  Anticipate eventual transition to PO antibiotics  Frequent drain checks/drain management  Follow-up blood cultures for completeness  If patient clinically worsens such as fevers, leukocytosis, hemodynamic instability, etc., would broaden antibiotics to CTX and flagyl  Serial abdominal examinations  Appreciate general surgery recommendations  Continue to follow CBCD and BMP with AM labs to monitor for potential antimicrobial toxicities and assess for clinical response to antibiotics.     Recent history of diverticulitis. Seen in the ED in May and diagnosed with diverticulitis. Treated with  Patient is a 26y old  Female who presents with a chief complaint of R 2nd toe cellulitis (07 Sep 2024 12:28)    INTERVAL EVENTS:    SUBJECTIVE:  Patient was seen and examined at bedside.    Review of systems: Patient denies: fever, chills, dizziness, HA, Changes in vision, CP, dyspnea, nausea or vomiting, dysuria, changes in bowel movements, LE edema. Rest of 12 point Review of systems negative unless otherwise documented elsewhere in note.     Diet, Regular (09-06-24 @ 01:08) [Active]      MEDICATIONS:  MEDICATIONS  (STANDING):  cefTRIAXone   IVPB 2000 milliGRAM(s) IV Intermittent every 24 hours  doxycycline IVPB 100 milliGRAM(s) IV Intermittent every 12 hours    MEDICATIONS  (PRN):      Allergies    No Known Allergies    Intolerances        OBJECTIVE:  Vital Signs Last 24 Hrs  T(C): 36.9 (07 Sep 2024 11:54), Max: 36.9 (06 Sep 2024 20:47)  T(F): 98.5 (07 Sep 2024 11:54), Max: 98.5 (06 Sep 2024 20:47)  HR: 65 (07 Sep 2024 11:54) (65 - 80)  BP: 111/69 (07 Sep 2024 11:54) (93/56 - 111/69)  BP(mean): 75 (06 Sep 2024 20:47) (69 - 75)  RR: 17 (07 Sep 2024 11:54) (17 - 19)  SpO2: 100% (07 Sep 2024 11:54) (98% - 100%)    Parameters below as of 07 Sep 2024 11:54  Patient On (Oxygen Delivery Method): room air      I&O's Summary      PHYSICAL EXAM:  Gen: Reclining in bed at time of exam, appears stated age  HEENT: NCAT, MMM, clear OP  Neck: supple, trachea at midline  CV: RRR, +S1/S2  Pulm: adequate respiratory effort, no increase in work of breathing  Abd: soft, NTND  Skin: warm and dry, no new rashes vs prior report  Ext: WWP, no LE edema, RLE w/ erythema at digits 2 - 4, erythema  Neuro: AOx3, no gross focal neurological deficits  Psych: affect and behavior appropriate, pleasant at time of interview    LABS:                        12.6   6.55  )-----------( 237      ( 07 Sep 2024 05:30 )             40.4     09-07    139  |  106  |  14  ----------------------------<  93  4.0   |  24  |  0.71    Ca    9.4      07 Sep 2024 05:30  Phos  3.4     09-07  Mg     2.1     09-07    TPro  7.1  /  Alb  4.2  /  TBili  0.4  /  DBili  x   /  AST  15  /  ALT  16  /  AlkPhos  72  09-06    LIVER FUNCTIONS - ( 06 Sep 2024 06:39 )  Alb: 4.2 g/dL / Pro: 7.1 g/dL / ALK PHOS: 72 U/L / ALT: 16 U/L / AST: 15 U/L / GGT: x             CAPILLARY BLOOD GLUCOSE        Urinalysis Basic - ( 07 Sep 2024 05:30 )    Color: x / Appearance: x / SG: x / pH: x  Gluc: 93 mg/dL / Ketone: x  / Bili: x / Urobili: x   Blood: x / Protein: x / Nitrite: x   Leuk Esterase: x / RBC: x / WBC x   Sq Epi: x / Non Sq Epi: x / Bacteria: x        MICRODATA:      RADIOLOGY/OTHER STUDIES:   PO Augmentin and reported improvement. No current LLQ pain or acute diverticulitis seen on imaging studies.     Above plan was discussed in detail with patient at bedside.   Above plan was discussed in detail with primary service resident, who agrees with the plan as above.    HISTORY OF PRESENT ILLNESS:  Reason for Consult: 1. Abdominal wall abscess  HPI: Nae Szymanski is a 73 y.o. year old female with pmhx of HTN who presented to [facility] on 7/22 with right sided abdominal pain. Per my record review, patient was most recently seen in the ED in May for LLQ abdominal pain and was diagnosed with diverticulitis. She was discharged on PO Augmentin and completed an 8 day course. Now, patient reports abdominal pain and chills for 6 days prompting ED evaluation. Per H&P, patient stated that the pain does not radiate. She denied fevers, nausea, vomiting, diarrhea. Upon arrival to the ED, patient was afebrile and hemodynamically stable. Labs demonstrated normal WBC to 9.63, elevated platelets to 432k, elevated T.bili to 1.04, and hypokalemia to 3.2. Lipase WNL. Patient underwent a CT A/P which showed multiple rim-enhancing collections involving the right lateral abdominal wall musculature suspicious for abscesses. The most cephalad component measures approximately 4.6 x 1.9 cm which appears to be in continuity with a posterior collection measuring 2.8 x 1.4 cm. The inferior most collection measures approximately 2.2 x 1.8 cm. Patient was evaluated by general surgery. Blood cultures were obtained and are pending. A MELVINA was performed and negative for vegetations. Per general surgery, her last CT scan from 5/24 which demonstrated diverticulitis showed evidence of a tiny fluid collection in the same area where she now has abscesses. IR was consulted and placed a drain into R abdominal wall abscess. 15 mL of purulence sent for cultures. Patient remains hemodynamically stable on cefazolin. ID is consulted  for antibiotic recommendations.     Per my discussion with Ms. Szymanski, she states that she does not recall having any injury or trauma to her abdomen. States that she does not inject anything, such as insulin or subcutaneous heparin. Denies any cuts, wounds, or bruises to her abdomen. She does recall having an exam when she was in the ED in May where she had pain with palpation of her RLQ due to deep pressure, but otherwise no other inciting incidents. Patient has not had a colonoscopy for over 10 years. No recent dental work. No shaking chills, fevers, or sweats at home. Denies nausea, vomiting, diarrhea, urinary symptoms. States she goes to [place] 3 times a week. No recent travel. No known sick contacts. No pets at home.     REVIEW OF SYSTEMS:  A complete 12 point system-based review of systems is otherwise negative.    PAST MEDICAL HISTORY:  History reviewed. No pertinent past medical history.  History reviewed. No pertinent surgical history.    FAMILY HISTORY:  Non-contributory    SOCIAL HISTORY:  Social History   Social History     Substance and Sexual Activity   Alcohol Use None     Social History     Substance and Sexual Activity   Drug Use Not on file     Social History     Tobacco Use   Smoking Status Never   Smokeless Tobacco Never       ALLERGIES:  Allergies   Allergen Reactions    Codeine Vomiting       MEDICATIONS:  All current active medications have been reviewed.    ANTIBIOTICS:  Cefazolin D2    PHYSICAL EXAM:  Temp:  [97.9 °F (36.6 °C)-98.9 °F (37.2 °C)] 97.9 °F (36.6 °C)  HR:  [65-78] 65  Resp:  [16-20] 17  BP: (111-140)/(64-83) 111/64  SpO2:  [89 %-95 %] 94 %  Temp (24hrs), Av.2 °F (36.8 °C), Min:97.9 °F (36.6 °C), Max:98.9 °F (37.2 °C)  Current: Temperature: 97.9 °F (36.6 °C)    Intake/Output Summary (Last 24 hours) at 2024 1252  Last data filed at 2024 0415  Gross per 24 hour   Intake 10 ml   Output 40 ml   Net -30 ml       General Appearance:  Appearing well, nontoxic,  and in no distress.   Head:  Normocephalic, without obvious abnormality, atraumatic.   Eyes:  Conjunctiva pink and sclera anicteric, both eyes.   Nose: Nares normal, mucosa normal, no drainage.   Throat: Oropharynx moist without lesions.   Neck: Supple, symmetrical, no adenopathy, no tenderness/mass/nodules.   Back:   Symmetric, ROM normal, no CVA tenderness.   Lungs:   Clear to auscultation bilaterally, respirations unlabored.   Chest Wall:  No tenderness or deformity.   Heart:  RRR; no murmur, rub or gallop.   Abdomen:   Soft, non-distended, positive bowel sounds throughout. Tenderness with palpation of RLQ at drain site. RLQ drain with purulent/bloody output.    Extremities: No cyanosis or clubbing, no edema.   Skin: No rashes or lesions. No draining wounds noted.   Neurologic: Alert and oriented times 3, follows commands, speech is organized and appropriate, symmetric facial movement.     LABS, IMAGING, & OTHER STUDIES:  Lab Results:  I have personally reviewed pertinent labs.  Results from last 7 days   Lab Units 07/23/24  0858 07/22/24  1013   WBC Thousand/uL 9.42 9.63   HEMOGLOBIN g/dL 13.1 13.9   PLATELETS Thousands/uL 442* 432*     Results from last 7 days   Lab Units 07/22/24  1013   POTASSIUM mmol/L 3.2*   CHLORIDE mmol/L 98   CO2 mmol/L 29   BUN mg/dL 16   CREATININE mg/dL 0.71   EGFR ml/min/1.73sq m 84   CALCIUM mg/dL 9.3   AST U/L 15   ALT U/L 17   ALK PHOS U/L 87     Results from last 7 days   Lab Units 07/22/24  1615 07/22/24  1329   BLOOD CULTURE   --  Received in Microbiology Lab. Culture in Progress.  Received in Microbiology Lab. Culture in Progress.   GRAM STAIN RESULT  3+ Polys  No bacteria seen  --    BODY FLUID CULTURE, STERILE  No growth  --                        Imaging Studies:   I have personally reviewed pertinent imaging study reports and images in PACS.    7/22 CT A/P: multiple rim-enhancing collections involving the right lateral abdominal wall musculature suspicious for abscesses.  The most cephalad component measures approximately 4.6 x 1.9 cm which appears to be in continuity with a posterior collection measuring 2.8 x 1.4 cm. The inferior most collection measures approximately 2.2 x 1.8 cm.     Other Studies:   I have personally reviewed pertinent reports.        Lorri East PA-C  [facility]

## 2025-05-15 ENCOUNTER — HOSPITAL ENCOUNTER (EMERGENCY)
Facility: HOSPITAL | Age: 75
Discharge: HOME | End: 2025-05-16
Attending: EMERGENCY MEDICINE
Payer: COMMERCIAL

## 2025-05-15 VITALS
HEART RATE: 79 BPM | RESPIRATION RATE: 18 BRPM | SYSTOLIC BLOOD PRESSURE: 173 MMHG | OXYGEN SATURATION: 96 % | TEMPERATURE: 98 F | DIASTOLIC BLOOD PRESSURE: 89 MMHG

## 2025-05-15 DIAGNOSIS — H60.503 ACUTE OTITIS EXTERNA OF BOTH EARS, UNSPECIFIED TYPE: Primary | ICD-10-CM

## 2025-05-15 PROCEDURE — 99284 EMERGENCY DEPT VISIT MOD MDM: CPT | Performed by: EMERGENCY MEDICINE

## 2025-05-15 PROCEDURE — 99282 EMERGENCY DEPT VISIT SF MDM: CPT

## 2025-05-15 RX ORDER — CIPROFLOXACIN AND DEXAMETHASONE 3; 1 MG/ML; MG/ML
4 SUSPENSION/ DROPS AURICULAR (OTIC) ONCE
Status: COMPLETED | OUTPATIENT
Start: 2025-05-15 | End: 2025-05-15

## 2025-05-15 RX ADMIN — CIPROFLOXACIN AND DEXAMETHASONE 4 DROP: 3; 1 SUSPENSION/ DROPS AURICULAR (OTIC) at 23:37

## 2025-05-16 NOTE — DISCHARGE INSTRUCTIONS
Please place 4 drops into each ear twice daily for 7 days.  Return to ED if worsening symptoms despite treatment

## 2025-05-16 NOTE — ED ATTENDING ATTESTATION
5/15/2025  IMacarena, DO, saw and evaluated the patient. I have discussed the patient with the resident/non-physician practitioner and agree with the resident's/non-physician practitioner's findings, Plan of Care, and MDM as documented in the resident's/non-physician practitioner's note, except where noted. All available labs and Radiology studies were reviewed.  I was present for key portions of any procedure(s) performed by the resident/non-physician practitioner and I was immediately available to provide assistance.       At this point I agree with the current assessment done in the Emergency Department.  I have conducted an independent evaluation of this patient a history and physical is as follows:    ED Course   74-year-old female presents emergency department for evaluation of bilateral, left greater than right ear pain.  Patient was evaluated at urgent care and diagnosed with acute otitis externa 2 days prior to arrival.  She was prescribed Cipro drops to apply to the ears.  Today the patient noted that the left ear has become much more swollen and her  attempted to place drops in the ears.  He noted that the medication appeared to be pulling in the ear and he was concerned that it was not being absorbed appropriately.  Patient states that the right ear is feeling much better however she continues to have left ear pain.  She does wear hearing aids but has not had them in place for the past few weeks.  She denies water exposure to the ears.  No fevers or chills.      Past medical history: Osteoarthritis, hypertension    Physical exam: Patient is awake and alert, nontoxic in appearance, afebrile.  There is no tenderness with palpation of the right or left mastoid.  No erythema or edema behind the ear.  The right TM appears scarred.  There is mild erythema of the right external auditory canal.  No tenderness with palpation of the right tragus.  Left TM is obscured due to significant edema of the left  external auditory canal.  There is moderate heaped up debris in the canal.  Mild tenderness with palpation of the tragus.  Mild pinna edema.  No active drainage noted from the ear canals.  Patient is swallowing without difficulty.  Dentition is intact.  Neck is supple and nontender without anterior posterior lymphadenopathy.    Assessment/plan: 74-year-old female presents with persistent pain and swelling after recent otitis externa diagnosis.  Will place wick into left external auditory canal to allow for further penetration of antibiotics.  Will switch prescription to Ciprodex to help with inflammation.  Discussed signs and symptoms return to the emergency department.  Patient to follow-up with ENT if symptoms or not improving.      Critical Care Time  Procedures

## 2025-05-16 NOTE — ED PROVIDER NOTES
Time reflects when diagnosis was documented in both MDM as applicable and the Disposition within this note       Time User Action Codes Description Comment    5/15/2025 11:24 PM Mark Lamas Add [H60.503] Acute otitis externa of both ears, unspecified type           ED Disposition       ED Disposition   Discharge    Condition   Stable    Date/Time   u May 15, 2025 11:44 PM    Comment   Nae Szymanski discharge to home/self care.                   Assessment & Plan       Medical Decision Making  Nae Szymanski is a 74 y.o. female with noncontributory past medical history being evaluated for bilateral ear pain.    Differential diagnoses include but not limited to: Otitis media, otitis externa, malignant otitis externa, mastoiditis, impacted cerumen    Physical exam findings consistent with left-sided otitis externa with inflamed ear canal, nontender with otoscope evaluation.  Unable to visualize tympanic membrane due to swelling.    See ED Course for data/imaging interpretation and further MDM.    Disposition: Etiology consistent with otitis externa.  Left-sided ear wick placed and patient prescribed Ciprodex eardrops.  Patient to use eardrops for 1 week.  Return to ED if worsening symptoms despite treatment.  Discharged home in stable condition.      Risk  Prescription drug management.             Medications   ciprofloxacin-dexamethasone (CIPRODEX) 0.3-0.1 % otic suspension 4 drop (4 drops Both Ears Given by Other 5/15/25 7572)       ED Risk Strat Scores                    No data recorded                            History of Present Illness       Chief Complaint   Patient presents with    Earache     Patient was seen at urgent care on Tuesday for double outer ear infection and started taking Ofloxacin drops. Reports increased swelling and sharp pain.        Past Medical History:   Diagnosis Date    Hypertension       Past Surgical History:   Procedure Laterality Date    IR DRAINAGE TUBE  CHECK/CHANGE/REPOSITION/REINSERTION/UPSIZE  7/30/2024    IR DRAINAGE TUBE PLACEMENT  7/22/2024      History reviewed. No pertinent family history.   Social History[1]   E-Cigarette/Vaping      E-Cigarette/Vaping Substances      I have reviewed and agree with the history as documented.     Nae Szymanski is a 74 y.o. female with noncontributory past medical history being evaluated for bilateral ear pain.  Patient was seen at urgent care 2 days ago for bilateral ear pain, started on Ofloxacin eardrops, reports worsening swelling and sharp left-sided ear pain.  Denies any significant prior history of ear infections in the past.  Denies any fever, chills, sore throat, cough. No additional complaints.            Earache      Review of Systems   HENT:  Positive for ear pain (Bilateral, left worse than right).    All other systems reviewed and are negative.          Objective       ED Triage Vitals [05/15/25 2122]   Temperature Pulse Blood Pressure Respirations SpO2 Patient Position - Orthostatic VS   98 °F (36.7 °C) 79 (!) 173/89 18 96 % Sitting      Temp Source Heart Rate Source BP Location FiO2 (%) Pain Score    Oral Monitor Left arm -- --      Vitals      Date and Time Temp Pulse SpO2 Resp BP Pain Score FACES Pain Rating User   05/15/25 2122 98 °F (36.7 °C) 79 96 % 18 173/89 -- -- MD            Physical Exam  Constitutional:       Appearance: Normal appearance.   HENT:      Right Ear: Tympanic membrane and external ear normal. Tenderness present. Tympanic membrane is not erythematous, retracted or bulging.      Left Ear: External ear normal. Swelling and tenderness present.      Ears:      Comments: Unable to visualize left-sided TM due to ear canal swelling and erythema, consistent with otitis externa    Eyes:      Extraocular Movements: Extraocular movements intact.      Conjunctiva/sclera: Conjunctivae normal.      Pupils: Pupils are equal, round, and reactive to light.       Cardiovascular:      Rate and  Rhythm: Normal rate and regular rhythm.      Pulses: Normal pulses.      Heart sounds: Normal heart sounds. No murmur heard.     No friction rub. No gallop.   Pulmonary:      Effort: Pulmonary effort is normal. No respiratory distress.      Breath sounds: Normal breath sounds. No stridor. No wheezing, rhonchi or rales.   Abdominal:      General: Abdomen is flat. Bowel sounds are normal. There is no distension.      Palpations: Abdomen is soft.      Tenderness: There is no abdominal tenderness. There is no rebound.     Skin:     General: Skin is warm and dry.      Capillary Refill: Capillary refill takes less than 2 seconds.     Neurological:      General: No focal deficit present.      Mental Status: She is alert and oriented to person, place, and time. Mental status is at baseline.         Results Reviewed       None            No orders to display       Procedures    ED Medication and Procedure Management   Prior to Admission Medications   Prescriptions Last Dose Informant Patient Reported? Taking?   acetaminophen (TYLENOL) 500 mg tablet   No No   Sig: Take 1 tablet (500 mg total) by mouth every 6 (six) hours as needed for mild pain   ibuprofen (MOTRIN) 400 mg tablet   No No   Sig: Take 1 tablet (400 mg total) by mouth every 6 (six) hours as needed for mild pain for up to 7 days   sodium chloride, PF, 0.9 %   No No   Sig: 10 mL by Intracatheter route daily Intracatheter flushing daily. May substitute prefilled syringe with normal saline 10 mL vials, 10 mL syringes, and 18 g blunt needles      Facility-Administered Medications: None     Patient's Medications   Discharge Prescriptions    No medications on file     No discharge procedures on file.  ED SEPSIS DOCUMENTATION   Time reflects when diagnosis was documented in both MDM as applicable and the Disposition within this note       Time User Action Codes Description Comment    5/15/2025 11:24 PM Mark Lamas Add [H60.503] Acute otitis externa of both ears,  unspecified type                      [1]   Social History  Tobacco Use    Smoking status: Never    Smokeless tobacco: Never   Substance Use Topics    Alcohol use: Yes     Comment: social    Drug use: Never        Mark Adams, DO  05/15/25 4220